# Patient Record
Sex: FEMALE | Race: WHITE | Employment: PART TIME | URBAN - METROPOLITAN AREA
[De-identification: names, ages, dates, MRNs, and addresses within clinical notes are randomized per-mention and may not be internally consistent; named-entity substitution may affect disease eponyms.]

---

## 2017-01-13 RX ORDER — ESCITALOPRAM OXALATE 10 MG/1
10 TABLET ORAL DAILY
Qty: 30 TABLET | Refills: 2 | Status: SHIPPED | OUTPATIENT
Start: 2017-01-13 | End: 2017-08-24 | Stop reason: ALTCHOICE

## 2017-01-13 RX ORDER — QUETIAPINE FUMARATE 50 MG/1
100 TABLET, EXTENDED RELEASE ORAL NIGHTLY
Qty: 60 TABLET | Refills: 2 | Status: SHIPPED | OUTPATIENT
Start: 2017-01-13 | End: 2017-08-24 | Stop reason: DRUGHIGH

## 2017-08-01 ENCOUNTER — TELEPHONE (OUTPATIENT)
Dept: FAMILY MEDICINE CLINIC | Age: 32
End: 2017-08-01

## 2017-08-24 ENCOUNTER — OFFICE VISIT (OUTPATIENT)
Dept: FAMILY MEDICINE CLINIC | Age: 32
End: 2017-08-24

## 2017-08-24 VITALS
RESPIRATION RATE: 16 BRPM | WEIGHT: 144 LBS | DIASTOLIC BLOOD PRESSURE: 70 MMHG | HEIGHT: 71 IN | HEART RATE: 70 BPM | TEMPERATURE: 99.2 F | BODY MASS INDEX: 20.16 KG/M2 | SYSTOLIC BLOOD PRESSURE: 112 MMHG

## 2017-08-24 DIAGNOSIS — F31.62 BIPOLAR DISORDER, CURRENT EPISODE MIXED, MODERATE (HCC): Primary | ICD-10-CM

## 2017-08-24 DIAGNOSIS — T50.902A SUICIDE ATTEMPT BY DRUG INGESTION, INITIAL ENCOUNTER (HCC): ICD-10-CM

## 2017-08-24 DIAGNOSIS — F51.04 PSYCHOPHYSIOLOGICAL INSOMNIA: ICD-10-CM

## 2017-08-24 PROCEDURE — 99213 OFFICE O/P EST LOW 20 MIN: CPT | Performed by: FAMILY MEDICINE

## 2017-08-24 RX ORDER — TRAZODONE HYDROCHLORIDE 100 MG/1
TABLET ORAL
Refills: 0 | COMMUNITY
Start: 2017-07-31 | End: 2017-08-24 | Stop reason: SDUPTHER

## 2017-08-24 RX ORDER — TRAZODONE HYDROCHLORIDE 100 MG/1
TABLET ORAL
Qty: 30 TABLET | Refills: 2 | Status: SHIPPED | OUTPATIENT
Start: 2017-08-24 | End: 2017-12-05 | Stop reason: SDUPTHER

## 2017-08-24 RX ORDER — VENLAFAXINE HYDROCHLORIDE 75 MG/1
CAPSULE, EXTENDED RELEASE ORAL
Qty: 30 CAPSULE | Refills: 2 | Status: SHIPPED | OUTPATIENT
Start: 2017-08-24 | End: 2017-12-05 | Stop reason: SDUPTHER

## 2017-08-24 RX ORDER — QUETIAPINE FUMARATE 100 MG/1
TABLET, FILM COATED ORAL
Qty: 60 TABLET | Refills: 2 | Status: SHIPPED | OUTPATIENT
Start: 2017-08-24 | End: 2017-12-05 | Stop reason: SDUPTHER

## 2017-08-24 RX ORDER — QUETIAPINE FUMARATE 100 MG/1
TABLET, FILM COATED ORAL
Refills: 0 | COMMUNITY
Start: 2017-07-31 | End: 2017-08-24 | Stop reason: SDUPTHER

## 2017-08-24 RX ORDER — VENLAFAXINE HYDROCHLORIDE 75 MG/1
CAPSULE, EXTENDED RELEASE ORAL
Refills: 0 | COMMUNITY
Start: 2017-07-31 | End: 2017-08-24 | Stop reason: SDUPTHER

## 2017-12-05 RX ORDER — TRAZODONE HYDROCHLORIDE 100 MG/1
TABLET ORAL
Qty: 30 TABLET | Refills: 2 | Status: SHIPPED | OUTPATIENT
Start: 2017-12-05 | End: 2018-07-03 | Stop reason: SDUPTHER

## 2017-12-05 RX ORDER — VENLAFAXINE HYDROCHLORIDE 75 MG/1
CAPSULE, EXTENDED RELEASE ORAL
Qty: 30 CAPSULE | Refills: 2 | Status: SHIPPED | OUTPATIENT
Start: 2017-12-05 | End: 2017-12-08 | Stop reason: SDUPTHER

## 2017-12-05 RX ORDER — QUETIAPINE FUMARATE 100 MG/1
TABLET, FILM COATED ORAL
Qty: 60 TABLET | Refills: 2 | Status: SHIPPED | OUTPATIENT
Start: 2017-12-05 | End: 2018-07-03 | Stop reason: SDUPTHER

## 2017-12-05 NOTE — TELEPHONE ENCOUNTER
Patient requesting refill. Medication pended for approval/denial. Thank you.     LOV  8/24/2017    NEXT APPT:  Future Appointments  Date Time Provider Fabian Holbrook   12/8/2017 8:30 AM MD Herlinda Jennings [facility] EMERGENCY [facility] AT [place]

## 2017-12-08 ENCOUNTER — OFFICE VISIT (OUTPATIENT)
Dept: FAMILY MEDICINE CLINIC | Age: 32
End: 2017-12-08

## 2017-12-08 VITALS
HEART RATE: 97 BPM | DIASTOLIC BLOOD PRESSURE: 70 MMHG | SYSTOLIC BLOOD PRESSURE: 108 MMHG | OXYGEN SATURATION: 99 % | WEIGHT: 146.6 LBS | HEIGHT: 71 IN | TEMPERATURE: 98.5 F | BODY MASS INDEX: 20.52 KG/M2

## 2017-12-08 DIAGNOSIS — F31.62 BIPOLAR DISORDER, CURRENT EPISODE MIXED, MODERATE (HCC): Primary | ICD-10-CM

## 2017-12-08 DIAGNOSIS — F51.04 PSYCHOPHYSIOLOGICAL INSOMNIA: ICD-10-CM

## 2017-12-08 PROCEDURE — 99213 OFFICE O/P EST LOW 20 MIN: CPT | Performed by: FAMILY MEDICINE

## 2017-12-08 PROCEDURE — G8484 FLU IMMUNIZE NO ADMIN: HCPCS | Performed by: FAMILY MEDICINE

## 2017-12-08 PROCEDURE — 4004F PT TOBACCO SCREEN RCVD TLK: CPT | Performed by: FAMILY MEDICINE

## 2017-12-08 PROCEDURE — 96160 PT-FOCUSED HLTH RISK ASSMT: CPT | Performed by: FAMILY MEDICINE

## 2017-12-08 PROCEDURE — G8427 DOCREV CUR MEDS BY ELIG CLIN: HCPCS | Performed by: FAMILY MEDICINE

## 2017-12-08 PROCEDURE — G8420 CALC BMI NORM PARAMETERS: HCPCS | Performed by: FAMILY MEDICINE

## 2017-12-08 RX ORDER — VENLAFAXINE HYDROCHLORIDE 150 MG/1
CAPSULE, EXTENDED RELEASE ORAL
Qty: 30 CAPSULE | Refills: 2 | Status: SHIPPED | OUTPATIENT
Start: 2017-12-08 | End: 2018-11-13

## 2017-12-08 RX ORDER — TRAZODONE HYDROCHLORIDE 100 MG/1
TABLET ORAL
Qty: 30 TABLET | Refills: 2 | Status: CANCELLED | OUTPATIENT
Start: 2017-12-08

## 2017-12-08 RX ORDER — QUETIAPINE FUMARATE 100 MG/1
TABLET, FILM COATED ORAL
Qty: 60 TABLET | Refills: 2 | Status: CANCELLED | OUTPATIENT
Start: 2017-12-08

## 2017-12-08 ASSESSMENT — PATIENT HEALTH QUESTIONNAIRE - PHQ9
3. TROUBLE FALLING OR STAYING ASLEEP: 0
4. FEELING TIRED OR HAVING LITTLE ENERGY: 2
6. FEELING BAD ABOUT YOURSELF - OR THAT YOU ARE A FAILURE OR HAVE LET YOURSELF OR YOUR FAMILY DOWN: 1
SUM OF ALL RESPONSES TO PHQ QUESTIONS 1-9: 11
10. IF YOU CHECKED OFF ANY PROBLEMS, HOW DIFFICULT HAVE THESE PROBLEMS MADE IT FOR YOU TO DO YOUR WORK, TAKE CARE OF THINGS AT HOME, OR GET ALONG WITH OTHER PEOPLE: 2
5. POOR APPETITE OR OVEREATING: 0
9. THOUGHTS THAT YOU WOULD BE BETTER OFF DEAD, OR OF HURTING YOURSELF: 0
8. MOVING OR SPEAKING SO SLOWLY THAT OTHER PEOPLE COULD HAVE NOTICED. OR THE OPPOSITE, BEING SO FIGETY OR RESTLESS THAT YOU HAVE BEEN MOVING AROUND A LOT MORE THAN USUAL: 3
SUM OF ALL RESPONSES TO PHQ9 QUESTIONS 1 & 2: 3
2. FEELING DOWN, DEPRESSED OR HOPELESS: 2
1. LITTLE INTEREST OR PLEASURE IN DOING THINGS: 1
7. TROUBLE CONCENTRATING ON THINGS, SUCH AS READING THE NEWSPAPER OR WATCHING TELEVISION: 2

## 2017-12-08 NOTE — PROGRESS NOTES
supple, no significant adenopathy, carotids upstroke normal bilaterally, no bruits, thyroid exam: thyroid is normal in size without nodules or tenderness  Chest - clear to auscultation, no wheezes, rales or rhonchi, symmetric air entry  Heart - normal rate, regular rhythm, normal S1, S2, no murmurs, rubs, clicks or gallops  Skin - normal coloration and turgor, no rashes, no suspicious skin lesions noted    1. Bipolar disorder, current episode mixed, moderate ([address]     2. Psychophysiological insomnia       I have reviewed the following diagnostic data: NA.  Please see report for additional information. Orders Placed This Encounter   Medications    venlafaxine (EFFEXOR XR) 150 MG extended release capsule     Sig: TAKE 1 CAPSULE BY MOUTH DAILY WITH BREAKFAST     Dispense:  30 capsule     Refill:  2     Patient's medications were refilled as noted. She will continue with After[facility] per [facility]. Her Effexor XR was increased to 150 mg by mouth daily. She may take 2 of her 75 mg tablets daily and then  the new prescription when needed if desired. Return in about 3 months (around 3/8/2018) for follow up on medications.

## 2018-01-11 ENCOUNTER — OFFICE VISIT (OUTPATIENT)
Dept: FAMILY MEDICINE CLINIC | Age: 33
End: 2018-01-11

## 2018-01-11 VITALS
OXYGEN SATURATION: 98 % | HEART RATE: 92 BPM | SYSTOLIC BLOOD PRESSURE: 118 MMHG | TEMPERATURE: 99.3 F | HEIGHT: 71 IN | WEIGHT: 145.4 LBS | DIASTOLIC BLOOD PRESSURE: 78 MMHG | BODY MASS INDEX: 20.35 KG/M2

## 2018-01-11 DIAGNOSIS — J01.10 ACUTE NON-RECURRENT FRONTAL SINUSITIS: Primary | ICD-10-CM

## 2018-01-11 PROCEDURE — G8420 CALC BMI NORM PARAMETERS: HCPCS | Performed by: FAMILY MEDICINE

## 2018-01-11 PROCEDURE — 99213 OFFICE O/P EST LOW 20 MIN: CPT | Performed by: FAMILY MEDICINE

## 2018-01-11 PROCEDURE — G8427 DOCREV CUR MEDS BY ELIG CLIN: HCPCS | Performed by: FAMILY MEDICINE

## 2018-01-11 PROCEDURE — G8484 FLU IMMUNIZE NO ADMIN: HCPCS | Performed by: FAMILY MEDICINE

## 2018-01-11 PROCEDURE — 4004F PT TOBACCO SCREEN RCVD TLK: CPT | Performed by: FAMILY MEDICINE

## 2018-01-11 RX ORDER — AMOXICILLIN AND CLAVULANATE POTASSIUM 875; 125 MG/1; MG/1
1 TABLET, FILM COATED ORAL 2 TIMES DAILY WITH MEALS
Qty: 20 TABLET | Refills: 0 | Status: SHIPPED | OUTPATIENT
Start: 2018-01-11 | End: 2018-01-21

## 2018-01-11 NOTE — PATIENT INSTRUCTIONS

## 2018-05-08 RX ORDER — VENLAFAXINE HYDROCHLORIDE 75 MG/1
CAPSULE, EXTENDED RELEASE ORAL
Qty: 30 CAPSULE | Refills: 1 | Status: SHIPPED | OUTPATIENT
Start: 2018-05-08 | End: 2018-07-03 | Stop reason: SDUPTHER

## 2018-05-15 NOTE — TELEPHONE ENCOUNTER
Pt called in asking for refills of seroquel, trazodone and effexor to be sent to [facility] [address]. Please approve or deny.  Thanks    Last ov and fills 8/24/17  Next scheduled ov 12/8/17    Pt can be reached at [phone] Yes, record another set of vital signs

## 2018-07-03 ENCOUNTER — OFFICE VISIT (OUTPATIENT)
Dept: FAMILY MEDICINE CLINIC | Age: 33
End: 2018-07-03
Payer: COMMERCIAL

## 2018-07-03 VITALS
WEIGHT: 133.2 LBS | DIASTOLIC BLOOD PRESSURE: 70 MMHG | SYSTOLIC BLOOD PRESSURE: 110 MMHG | BODY MASS INDEX: 18.65 KG/M2 | HEIGHT: 71 IN | HEART RATE: 90 BPM | OXYGEN SATURATION: 97 % | TEMPERATURE: 99.5 F

## 2018-07-03 DIAGNOSIS — G43.009 MIGRAINE WITHOUT AURA AND WITHOUT STATUS MIGRAINOSUS, NOT INTRACTABLE: ICD-10-CM

## 2018-07-03 DIAGNOSIS — F31.62 BIPOLAR DISORDER, CURRENT EPISODE MIXED, MODERATE (HCC): Primary | ICD-10-CM

## 2018-07-03 DIAGNOSIS — F51.04 PSYCHOPHYSIOLOGICAL INSOMNIA: ICD-10-CM

## 2018-07-03 DIAGNOSIS — L23.7 POISON IVY DERMATITIS: ICD-10-CM

## 2018-07-03 DIAGNOSIS — Z13.31 POSITIVE DEPRESSION SCREENING: ICD-10-CM

## 2018-07-03 PROCEDURE — 96160 PT-FOCUSED HLTH RISK ASSMT: CPT | Performed by: FAMILY MEDICINE

## 2018-07-03 PROCEDURE — 99214 OFFICE O/P EST MOD 30 MIN: CPT | Performed by: FAMILY MEDICINE

## 2018-07-03 PROCEDURE — G8427 DOCREV CUR MEDS BY ELIG CLIN: HCPCS | Performed by: FAMILY MEDICINE

## 2018-07-03 PROCEDURE — G8431 POS CLIN DEPRES SCRN F/U DOC: HCPCS | Performed by: FAMILY MEDICINE

## 2018-07-03 PROCEDURE — 4004F PT TOBACCO SCREEN RCVD TLK: CPT | Performed by: FAMILY MEDICINE

## 2018-07-03 PROCEDURE — G8420 CALC BMI NORM PARAMETERS: HCPCS | Performed by: FAMILY MEDICINE

## 2018-07-03 RX ORDER — ELETRIPTAN HYDROBROMIDE 20 MG/1
20 TABLET, FILM COATED ORAL
Qty: 6 TABLET | Refills: 2 | Status: SHIPPED | OUTPATIENT
Start: 2018-07-03 | End: 2018-07-03

## 2018-07-03 RX ORDER — TRAZODONE HYDROCHLORIDE 100 MG/1
TABLET ORAL
Qty: 30 TABLET | Refills: 3 | Status: SHIPPED | OUTPATIENT
Start: 2018-07-03 | End: 2018-11-13 | Stop reason: SDUPTHER

## 2018-07-03 RX ORDER — VENLAFAXINE HYDROCHLORIDE 150 MG/1
150 CAPSULE, EXTENDED RELEASE ORAL DAILY
Qty: 30 CAPSULE | Refills: 3 | Status: SHIPPED | OUTPATIENT
Start: 2018-07-03 | End: 2018-11-13 | Stop reason: SDUPTHER

## 2018-07-03 RX ORDER — QUETIAPINE FUMARATE 100 MG/1
TABLET, FILM COATED ORAL
Qty: 60 TABLET | Refills: 3 | Status: SHIPPED | OUTPATIENT
Start: 2018-07-03 | End: 2018-11-13 | Stop reason: SDUPTHER

## 2018-07-03 ASSESSMENT — PATIENT HEALTH QUESTIONNAIRE - PHQ9
4. FEELING TIRED OR HAVING LITTLE ENERGY: 3
1. LITTLE INTEREST OR PLEASURE IN DOING THINGS: 2
SUM OF ALL RESPONSES TO PHQ QUESTIONS 1-9: 11
10. IF YOU CHECKED OFF ANY PROBLEMS, HOW DIFFICULT HAVE THESE PROBLEMS MADE IT FOR YOU TO DO YOUR WORK, TAKE CARE OF THINGS AT HOME, OR GET ALONG WITH OTHER PEOPLE: 1
8. MOVING OR SPEAKING SO SLOWLY THAT OTHER PEOPLE COULD HAVE NOTICED. OR THE OPPOSITE, BEING SO FIGETY OR RESTLESS THAT YOU HAVE BEEN MOVING AROUND A LOT MORE THAN USUAL: 1
SUM OF ALL RESPONSES TO PHQ9 QUESTIONS 1 & 2: 3
3. TROUBLE FALLING OR STAYING ASLEEP: 3
2. FEELING DOWN, DEPRESSED OR HOPELESS: 1
5. POOR APPETITE OR OVEREATING: 0
6. FEELING BAD ABOUT YOURSELF - OR THAT YOU ARE A FAILURE OR HAVE LET YOURSELF OR YOUR FAMILY DOWN: 0
7. TROUBLE CONCENTRATING ON THINGS, SUCH AS READING THE NEWSPAPER OR WATCHING TELEVISION: 1
9. THOUGHTS THAT YOU WOULD BE BETTER OFF DEAD, OR OF HURTING YOURSELF: 0

## 2018-07-03 NOTE — PROGRESS NOTES
Chief Complaint   Patient presents with    Follow-up     LOV 01/11/2018    Manic Behavior    Insomnia    Discuss Medications     pt states that she had stopped taking her medication after moving out of her mother's house because they were fighting, she was off of her meds for about 1.5 months, she recently started taking the Effexor again (150 mg) but is out of the Seroquel and Trazodone     HPI: Lloyd Potter is a 35 y.o. female presenting for follow-up of Bipolar Disorder and Insomnia. I last saw the patient 01/11/2018. She does have poison ivy and has been applying a prescription steroid cream. It still has been active. She is having headaches on a daily basis and does take Excedrin Migraine and it generally is ineffective. She has been on Imitrex as a teenager without much relief. Her headaches are across the forehead and are a piercing type of pain. They may last for several hours to 2 days. Some nausea and vomiting at times. No vision changes noted. She did restart her Effexor about 1 week ago. Past Medical History:   Diagnosis Date    Depression        No past surgical history on file. family history includes Asthma in her mother; Cancer in her maternal grandmother; High Blood Pressure in her maternal aunt; Other in her mother. Social History     Social History    Marital status: Single     Spouse name: N/A    Number of children: N/A    Years of education: N/A     Occupational History    Not on file.      Social History Main Topics    Smoking status: Current Every Day Smoker     Packs/day: 1.00     Years: 12.00    Smokeless tobacco: Never Used      Comment: ecigs    Alcohol use No    Drug use: No    Sexual activity: Not on file     Other Topics Concern    Not on file     Social History Narrative    No narrative on file       No Known Allergies    Review of Systems - General ROS: negative  Psychological ROS: positive for - concentration difficulties and sleep disturbances  ENT ROS: = Moderate depression, 15-19 = Moderately severe depression, 20-27 = Severe depression      On the basis of positive PHQ-9 screening (PHQ-9 Total Score: 11), the following plan was implemented: medication prescribed: Effexor- 150 mg with Seroquel 100 mg daily and trazodone 100 mg daily. - patient will call for any significant medication side effects or worsening symptoms of depression. Patient will follow-up in 1 month(s) with PCP.

## 2018-11-13 ENCOUNTER — OFFICE VISIT (OUTPATIENT)
Dept: FAMILY MEDICINE CLINIC | Age: 33
End: 2018-11-13
Payer: COMMERCIAL

## 2018-11-13 VITALS
OXYGEN SATURATION: 98 % | HEIGHT: 70 IN | DIASTOLIC BLOOD PRESSURE: 68 MMHG | SYSTOLIC BLOOD PRESSURE: 112 MMHG | WEIGHT: 146 LBS | TEMPERATURE: 97.8 F | RESPIRATION RATE: 12 BRPM | BODY MASS INDEX: 20.9 KG/M2 | HEART RATE: 57 BPM

## 2018-11-13 DIAGNOSIS — Z00.00 WELLNESS EXAMINATION: Primary | ICD-10-CM

## 2018-11-13 PROCEDURE — G8484 FLU IMMUNIZE NO ADMIN: HCPCS | Performed by: NURSE PRACTITIONER

## 2018-11-13 PROCEDURE — 99395 PREV VISIT EST AGE 18-39: CPT | Performed by: NURSE PRACTITIONER

## 2018-11-13 RX ORDER — TRAZODONE HYDROCHLORIDE 100 MG/1
TABLET ORAL
Qty: 30 TABLET | Refills: 3 | Status: SHIPPED | OUTPATIENT
Start: 2018-11-13

## 2018-11-13 RX ORDER — VENLAFAXINE HYDROCHLORIDE 150 MG/1
150 CAPSULE, EXTENDED RELEASE ORAL DAILY
Qty: 30 CAPSULE | Refills: 2 | Status: SHIPPED | OUTPATIENT
Start: 2018-11-13 | End: 2021-11-25

## 2018-11-13 RX ORDER — QUETIAPINE FUMARATE 100 MG/1
TABLET, FILM COATED ORAL
Qty: 30 TABLET | Refills: 2 | Status: SHIPPED | OUTPATIENT
Start: 2018-11-13

## 2018-11-13 ASSESSMENT — ENCOUNTER SYMPTOMS
SHORTNESS OF BREATH: 0
ABDOMINAL PAIN: 0
CHEST TIGHTNESS: 0

## 2021-08-12 ENCOUNTER — HOSPITAL ENCOUNTER (EMERGENCY)
Age: 36
Discharge: HOME | End: 2021-08-12
Attending: EMERGENCY MEDICINE
Payer: COMMERCIAL

## 2021-08-12 VITALS
WEIGHT: 140 LBS | HEART RATE: 60 BPM | TEMPERATURE: 98.3 F | HEIGHT: 70 IN | OXYGEN SATURATION: 100 % | DIASTOLIC BLOOD PRESSURE: 77 MMHG | RESPIRATION RATE: 16 BRPM | BODY MASS INDEX: 20.04 KG/M2 | SYSTOLIC BLOOD PRESSURE: 119 MMHG

## 2021-08-12 DIAGNOSIS — K08.89 PAIN, DENTAL: Primary | ICD-10-CM

## 2021-08-12 PROCEDURE — 99283 EMERGENCY DEPT VISIT LOW MDM: CPT

## 2021-08-12 RX ORDER — HYDROCODONE BITARTRATE AND ACETAMINOPHEN 5; 325 MG/1; MG/1
1 TABLET ORAL EVERY 4 HOURS PRN
Qty: 12 TABLET | Refills: 0 | Status: SHIPPED | OUTPATIENT
Start: 2021-08-12 | End: 2021-08-15

## 2021-08-12 RX ORDER — AMOXICILLIN 500 MG/1
500 CAPSULE ORAL 2 TIMES DAILY
Qty: 14 CAPSULE | Refills: 0 | Status: SHIPPED | OUTPATIENT
Start: 2021-08-12 | End: 2021-08-19

## 2021-08-12 ASSESSMENT — PAIN DESCRIPTION - DESCRIPTORS
DESCRIPTORS: THROBBING;STABBING
DESCRIPTORS: THROBBING;STABBING

## 2021-08-12 ASSESSMENT — PAIN DESCRIPTION - LOCATION
LOCATION: TEETH
LOCATION: TEETH

## 2021-08-12 ASSESSMENT — PAIN SCALES - GENERAL
PAINLEVEL_OUTOF10: 10
PAINLEVEL_OUTOF10: 10

## 2021-08-12 ASSESSMENT — PAIN DESCRIPTION - PROGRESSION
CLINICAL_PROGRESSION: GRADUALLY WORSENING
CLINICAL_PROGRESSION: GRADUALLY WORSENING

## 2021-08-12 ASSESSMENT — PAIN DESCRIPTION - FREQUENCY
FREQUENCY: CONTINUOUS
FREQUENCY: CONTINUOUS

## 2021-08-12 ASSESSMENT — PAIN DESCRIPTION - ONSET
ONSET: PROGRESSIVE
ONSET: PROGRESSIVE

## 2021-08-12 ASSESSMENT — PAIN DESCRIPTION - PAIN TYPE
TYPE: ACUTE PAIN
TYPE: ACUTE PAIN

## 2021-08-12 ASSESSMENT — PAIN DESCRIPTION - ORIENTATION
ORIENTATION: RIGHT;LOWER
ORIENTATION: RIGHT;LOWER

## 2021-08-12 NOTE — ED NOTES
Discharge instructions given. Patient verbalizes understanding and denies any questions. Scripts x2 given. Patient ambulating with steady gait upon discharge.         Zach Asencio RN  08/12/21 9981

## 2021-08-12 NOTE — ED PROVIDER NOTES
[facility] ED  EMERGENCY DEPARTMENT ENCOUNTER      Pt Name: Kylie John  MRN: [id number]  [place] 1985  Date of evaluation: 8/12/2021  Provider: Mirella Peñaloza MD    CHIEF COMPLAINT       Chief Complaint   Patient presents with    Dental Pain    Oral Swelling         HISTORY OF PRESENT ILLNESS   (Location/Symptom, Timing/Onset, Context/Setting, Quality, Duration, Modifying Factors, Severity)  Note limiting factors. Presents with bottom R molar pain. Has appt with dentist in 3-4 days. Pain ongoing for 2 days and getting worse. Not controlled with ibuprofen. Able to eat and drink. Pain is constant and nonradiating. Nursing Notes were reviewed. REVIEW OF SYSTEMS    (2-9 systems for level 4, 10 or more for level 5)     Review of Systems   HENT: Positive for dental problem. All other systems reviewed and are negative. Except as noted above the remainder of the review of systems was reviewed and negative. PAST MEDICAL HISTORY     Past Medical History:   Diagnosis Date    Depression          SURGICAL HISTORY     No past surgical history on file. CURRENT MEDICATIONS       Previous Medications    ELETRIPTAN (RELPAX) 20 MG TABLET    Take 1 tablet by mouth once as needed (migraine headache) may repeat in 2 hours if necessary    QUETIAPINE (SEROQUEL) 100 MG TABLET    Take 1 Tab PO QHS    TRAZODONE (DESYREL) 100 MG TABLET    Take 1 Tab PO QHS    VENLAFAXINE (EFFEXOR XR) 150 MG EXTENDED RELEASE CAPSULE    Take 1 capsule by mouth daily       ALLERGIES     Patient has no known allergies.     FAMILY HISTORY       Family History   Problem Relation Age of Onset    Asthma Mother     Other Mother         beverly's and hypothyroidism    High Blood Pressure Maternal Aunt     Cancer Maternal Grandmother           SOCIAL HISTORY       Social History     Socioeconomic History    Marital status: Single     Spouse name: Not on file    Number of children: Not on file    Years of education: Not on file    Highest education level: Not on file   Occupational History    Not on file   Tobacco Use    Smoking status: Current Every Day Smoker     Packs/day: 1.00     Years: 12.00     Pack years: 12.00    Smokeless tobacco: Never Used    Tobacco comment: ecigs   Substance and Sexual Activity    Alcohol use: No    Drug use: No    Sexual activity: Not on file   Other Topics Concern    Not on file   Social History Narrative    Not on file     Social Determinants of Health     Financial Resource Strain:     Difficulty of Paying Living Expenses:    Food Insecurity:     Worried About Running Out of Food in the Last Year:     Ran Out of Food in the Last Year:    Transportation Needs:     Lack of Transportation (Medical):  Lack of Transportation (Non-Medical):    Physical Activity:     Days of Exercise per Week:     Minutes of Exercise per Session:    Stress:     Feeling of Stress :    Social Connections:     Frequency of Communication with Friends and Family:     Frequency of Social Gatherings with Friends and Family:     Attends Congregation Services:     Active Member of Clubs or Organizations:     Attends Club or Organization Meetings:     Marital Status:    Intimate Partner Violence:     Fear of Current or Ex-Partner:     Emotionally Abused:     Physically Abused:     Sexually Abused:        SCREENINGS               PHYSICAL EXAM    (up to 7 for level 4, 8 or more for level 5)     ED Triage Vitals [08/12/21 1640]   BP Temp Temp Source Pulse Resp SpO2 Height Weight   115/69 98.3 °F (36.8 °C) Skin 65 12 99 % 5' 10\" (1.778 m) 140 lb (63.5 kg)       Physical Exam  Vitals and nursing note reviewed. Constitutional:       General: She is not in acute distress. Appearance: Normal appearance. She is well-developed. She is not ill-appearing. HENT:      Head: Normocephalic and atraumatic.       Mouth/Throat:      Mouth: Mucous membranes are moist.      Pharynx: Oropharynx is clear.      Comments: Multiple dental caries, no abscess  Eyes:      Extraocular Movements: Extraocular movements intact. Conjunctiva/sclera: Conjunctivae normal.   Cardiovascular:      Rate and Rhythm: Normal rate and regular rhythm. Pulmonary:      Effort: Pulmonary effort is normal.      Breath sounds: Normal breath sounds. Abdominal:      General: Bowel sounds are normal.      Palpations: Abdomen is soft. Tenderness: There is no abdominal tenderness. Musculoskeletal:         General: No deformity. Normal range of motion. Cervical back: Normal range of motion and neck supple. Skin:     General: Skin is warm and dry. Capillary Refill: Capillary refill takes less than 2 seconds. Neurological:      General: No focal deficit present. Mental Status: She is alert and oriented to person, place, and time. Mental status is at baseline. Cranial Nerves: No cranial nerve deficit. Psychiatric:         Thought Content: Thought content normal.         DIAGNOSTIC RESULTS     EKG: All EKG's are interpreted by the Emergency Department Physician who either signs or Co-signs this chart in the absence of a cardiologist.    RADIOLOGY:   Non-plain film images such as CT, Ultrasound and MRI are read by the radiologist. Plain radiographic images are visualized and preliminarily interpreted by the emergency physician with the below findings:    Interpretation per the Radiologist below, if available at the time of this note:    No orders to display       LABS:  Labs Reviewed - No data to display    All other labs were within normal range or not returned as of this dictation.     EMERGENCY DEPARTMENT COURSE and DIFFERENTIAL DIAGNOSIS/MDM:   Vitals:    Vitals:    08/12/21 1640   BP: 115/69   Pulse: 65   Resp: 12   Temp: 98.3 °F (36.8 °C)   TempSrc: Skin   SpO2: 99%   Weight: 140 lb (63.5 kg)   Height: 5' 10\" (1.778 m)       MDM    Procedures    CRITICAL CARE TIME   Total Critical Care time was 0 minutes, excluding separately reportable procedures. There was a high probability of clinically significant/life threatening deterioration in the patient's condition which required my urgent intervention. FINAL IMPRESSION      1.  Pain, dental Stable         DISPOSITION/PLAN   DISPOSITION Decision To Discharge 08/12/2021 06:21:08 PM      (Please note that portions of this note were completed with a voice recognition program.  Efforts were made to edit the dictations but occasionally words are mis-transcribed.)    Ty López MD (electronically signed)  Attending Emergency Physician        Ty López MD  08/12/21 0240

## 2021-11-25 ENCOUNTER — HOSPITAL ENCOUNTER (EMERGENCY)
Age: 36
Discharge: HOME | End: 2021-11-25
Payer: COMMERCIAL

## 2021-11-25 VITALS
DIASTOLIC BLOOD PRESSURE: 78 MMHG | SYSTOLIC BLOOD PRESSURE: 121 MMHG | RESPIRATION RATE: 16 BRPM | HEART RATE: 91 BPM | HEIGHT: 70 IN | OXYGEN SATURATION: 98 % | WEIGHT: 145 LBS | TEMPERATURE: 98.5 F | BODY MASS INDEX: 20.76 KG/M2

## 2021-11-25 DIAGNOSIS — N30.00 ACUTE CYSTITIS WITHOUT HEMATURIA: Primary | ICD-10-CM

## 2021-11-25 LAB
BACTERIA: ABNORMAL /HPF
BILIRUBIN URINE: NEGATIVE
BLOOD, URINE: NEGATIVE
CLARITY: CLEAR
COLOR: YELLOW
EPITHELIAL CELLS, UA: ABNORMAL /HPF (ref 0–5)
GLUCOSE URINE: NEGATIVE MG/DL
HCG, URINE, POC: NEGATIVE
HYALINE CASTS: ABNORMAL /HPF (ref 0–5)
KETONES, URINE: ABNORMAL MG/DL
LEUKOCYTE ESTERASE, URINE: ABNORMAL
Lab: NORMAL
NEGATIVE QC PASS/FAIL: NORMAL
NITRITE, URINE: NEGATIVE
PH UA: 5.5 (ref 5–9)
POSITIVE QC PASS/FAIL: NORMAL
PROTEIN UA: NEGATIVE MG/DL
RBC UA: ABNORMAL /HPF (ref 0–5)
SPECIFIC GRAVITY UA: 1.03 (ref 1–1.03)
URINE REFLEX TO CULTURE: YES
UROBILINOGEN, URINE: 0.2 E.U./DL
WBC UA: ABNORMAL /HPF (ref 0–5)

## 2021-11-25 PROCEDURE — 6370000000 HC RX 637 (ALT 250 FOR IP): Performed by: PHYSICIAN ASSISTANT

## 2021-11-25 PROCEDURE — 87186 SC STD MICRODIL/AGAR DIL: CPT

## 2021-11-25 PROCEDURE — 87086 URINE CULTURE/COLONY COUNT: CPT

## 2021-11-25 PROCEDURE — 81001 URINALYSIS AUTO W/SCOPE: CPT

## 2021-11-25 PROCEDURE — 99284 EMERGENCY DEPT VISIT MOD MDM: CPT

## 2021-11-25 PROCEDURE — 87077 CULTURE AEROBIC IDENTIFY: CPT

## 2021-11-25 RX ORDER — CEPHALEXIN 500 MG/1
500 CAPSULE ORAL ONCE
Status: COMPLETED | OUTPATIENT
Start: 2021-11-25 | End: 2021-11-25

## 2021-11-25 RX ORDER — CEPHALEXIN 500 MG/1
500 CAPSULE ORAL 2 TIMES DAILY
Qty: 14 CAPSULE | Refills: 0 | Status: SHIPPED | OUTPATIENT
Start: 2021-11-25 | End: 2021-12-02

## 2021-11-25 RX ORDER — PHENAZOPYRIDINE HYDROCHLORIDE 200 MG/1
200 TABLET, FILM COATED ORAL 3 TIMES DAILY PRN
Qty: 6 TABLET | Refills: 0 | Status: SHIPPED | OUTPATIENT
Start: 2021-11-25 | End: 2021-11-28

## 2021-11-25 RX ADMIN — CEPHALEXIN 500 MG: 500 CAPSULE ORAL at 16:15

## 2021-11-25 ASSESSMENT — ENCOUNTER SYMPTOMS
EYE PAIN: 0
ALLERGIC/IMMUNOLOGIC NEGATIVE: 1
SHORTNESS OF BREATH: 0
TROUBLE SWALLOWING: 0
COLOR CHANGE: 0
APNEA: 0
ABDOMINAL PAIN: 0

## 2021-11-25 ASSESSMENT — PAIN SCALES - GENERAL: PAINLEVEL_OUTOF10: 2

## 2021-11-25 ASSESSMENT — PAIN DESCRIPTION - LOCATION: LOCATION: VAGINA

## 2021-11-25 ASSESSMENT — PAIN DESCRIPTION - DESCRIPTORS: DESCRIPTORS: OTHER (COMMENT)

## 2021-11-25 ASSESSMENT — PAIN DESCRIPTION - PAIN TYPE: TYPE: ACUTE PAIN

## 2021-11-25 NOTE — ED TRIAGE NOTES
A & Ox4. Skin pink warm and dry. Pt states she has had burning with urination x 2 days. Denies any other complaints. Denies abdominal pain or vaginal discharge.

## 2021-11-25 NOTE — ED PROVIDER NOTES
[facility] ED  eMERGENCYdEPARTMENT eNCOUnter      Pt Name: Phil Bush  MRN: [id number]  [place] 1985  Date of evaluation: 11/25/2021  Provider:Bernardo Call PA-C    CHIEF COMPLAINT       Chief Complaint   Patient presents with    Dysuria     Burning with urination x 2 days         HISTORY OF PRESENT ILLNESS  (Location/Symptom, Timing/Onset, Context/Setting, Quality, Duration, Modifying Factors, Severity.)   Phil Bush is a 39 y.o. female who presents to the emergency department with a 2-day history of urinary urgency, frequency and dysuria. Patient denies any nausea, vomiting or diarrhea. No fevers or chills. No chance of pregnancy. Patient has not tried taking anything over-the-counter for the symptoms. HPI    Nursing Notes were reviewed and I agree. REVIEW OF SYSTEMS    (2-9 systems for level 4, 10 or more for level 5)     Review of Systems   Constitutional: Negative for diaphoresis and fever. HENT: Negative for hearing loss and trouble swallowing. Eyes: Negative for pain. Respiratory: Negative for apnea and shortness of breath. Cardiovascular: Negative for chest pain. Gastrointestinal: Negative for abdominal pain. Endocrine: Negative. Genitourinary: Positive for dysuria, frequency and urgency. Negative for hematuria. Musculoskeletal: Negative for neck pain and neck stiffness. Skin: Negative for color change. Allergic/Immunologic: Negative. Neurological: Negative for dizziness and numbness. Hematological: Negative. Psychiatric/Behavioral: Negative. All other systems reviewed and are negative. Except as noted above the remainder of the review of systems was reviewed and negative. PAST MEDICAL HISTORY     Past Medical History:   Diagnosis Date    Depression     Migraines          SURGICAL HISTORY     History reviewed. No pertinent surgical history.       CURRENT MEDICATIONS       Previous Medications    ELETRIPTAN (RELPAX) 20 MG TABLET Take 1 tablet by mouth once as needed (migraine headache) may repeat in 2 hours if necessary    QUETIAPINE (SEROQUEL) 100 MG TABLET    Take 1 Tab PO QHS    TRAZODONE (DESYREL) 100 MG TABLET    Take 1 Tab PO QHS    VENLAFAXINE (EFFEXOR XR) 150 MG EXTENDED RELEASE CAPSULE    Take 1 capsule by mouth daily       ALLERGIES     Patient has no known allergies. FAMILY HISTORY       Family History   Problem Relation Age of Onset   [facility] Asthma Mother     Other Mother         beverly's and hypothyroidism    High Blood Pressure Maternal Aunt     Cancer Maternal Grandmother           SOCIAL HISTORY       Social History     Socioeconomic History    Marital status: Single     Spouse name: None    Number of children: None    Years of education: None    Highest education level: None   Occupational History    None   Tobacco Use    Smoking status: Current Every Day Smoker     Packs/day: 0.50     Years: 12.00     Pack years: 6.00     Types: Cigarettes    Smokeless tobacco: Never Used    Tobacco comment: ecigs   Vaping Use    Vaping Use: Never used   Substance and Sexual Activity    Alcohol use: Yes     Comment: rarely    Drug use: Yes     Types: Marijuana Ruba Blaine)    Sexual activity: None   Other Topics Concern    None   Social History Narrative    None     Social Determinants of Health     Financial Resource Strain:     Difficulty of Paying Living Expenses: Not on file   Food Insecurity:     Worried About Running Out of Food in the Last Year: Not on file    Panfilo of Food in the Last Year: Not on file   Transportation Needs:     Lack of Transportation (Medical): Not on file    Lack of Transportation (Non-Medical):  Not on file   Physical Activity:     Days of Exercise per Week: Not on file    Minutes of Exercise per Session: Not on file   Stress:     Feeling of Stress : Not on file   Social Connections:     Frequency of Communication with Friends and Family: Not on file    Frequency of Social Gatherings with Friends and Family: Not on file    Attends Jehovah's witness Services: Not on file    Active Member of Clubs or Organizations: Not on file    Attends Club or Organization Meetings: Not on file    Marital Status: Not on file   Intimate Partner Violence:     Fear of Current or Ex-Partner: Not on file    Emotionally Abused: Not on file    Physically Abused: Not on file    Sexually Abused: Not on file   Housing Stability:     Unable to Pay for Housing in the Last Year: Not on file    Number of [place] in the Last Year: Not on file    Unstable Housing in the Last Year: Not on file       SCREENINGS           PHYSICAL EXAM    (up to 7 forlevel 4, 8 or more for level 5)     ED Triage Vitals [11/25/21 1524]   BP Temp Temp Source Pulse Resp SpO2 Height Weight   121/78 98.5 °F (36.9 °C) Oral 91 16 98 % 5' 10\" (1.778 m) 145 lb (65.8 kg)       Physical Exam  Vitals and nursing note reviewed. Constitutional:       General: She is not in acute distress. Appearance: She is well-developed. She is not diaphoretic. HENT:      Head: Normocephalic and atraumatic. Mouth/Throat:      Pharynx: No oropharyngeal exudate. Eyes:      General: No scleral icterus. Conjunctiva/sclera: Conjunctivae normal.      Pupils: Pupils are equal, round, and reactive to light. Neck:      Trachea: No tracheal deviation. Cardiovascular:      Rate and Rhythm: Normal rate. Heart sounds: Normal heart sounds. Pulmonary:      Effort: Pulmonary effort is normal. No respiratory distress. Breath sounds: Normal breath sounds. Abdominal:      General: Bowel sounds are normal. There is no distension. Palpations: Abdomen is soft. Musculoskeletal:         General: Normal range of motion. Cervical back: Normal range of motion and neck supple. Skin:     General: Skin is warm and dry. Findings: No erythema or rash. Neurological:      Mental Status: She is alert and oriented to person, place, and time. Cranial Nerves: No cranial nerve deficit. Motor: No abnormal muscle tone. Psychiatric:         Behavior: Behavior normal.         Thought Content: Thought content normal.         Judgment: Judgment normal.           DIAGNOSTIC RESULTS     RADIOLOGY:   Non-plain film images such as CT, Ultrasound and MRI are read by the radiologist. Plain radiographic images are visualized and preliminarilyinterpreted by Judith Nina PA-C with the below findings:      Interpretation per the Radiologist below, if available at the time of this note:    No orders to display       LABS:  Labs Reviewed   URINE RT REFLEX TO CULTURE - Abnormal; Notable for the following components:       Result Value    Ketones, Urine TRACE (*)     Leukocyte Esterase, Urine SMALL (*)     All other components within normal limits   MICROSCOPIC URINALYSIS - Abnormal; Notable for the following components:    Bacteria, UA MANY (*)     WBC, UA 10-20 (*)     RBC, UA 3-5 (*)     All other components within normal limits   CULTURE, URINE   POC PREGNANCY UR-QUAL       All other labs were within normal range or not returnedas of this dictation. EMERGENCYDEPARTMENT COURSE and DIFFERENTIAL DIAGNOSIS/MDM:   Vitals:    Vitals:    11/25/21 1524   BP: 121/78   Pulse: 91   Resp: 16   Temp: 98.5 °F (36.9 °C)   TempSrc: Oral   SpO2: 98%   Weight: 145 lb (65.8 kg)   Height: 5' 10\" (1.778 m)       REASSESSMENT        Patient presents emergency department with urinary urgency, frequency and dysuria. Symptomatic for UTI today. Patient placed on Pyridium and Keflex. Follow-up with PCP    MDM    PROCEDURES:    Procedures      FINAL IMPRESSION      1.  Acute cystitis without hematuria          DISPOSITION/PLAN   DISPOSITION Discharge - Pending Orders Complete 11/25/2021 04:05:07 PM      PATIENT REFERRED TO:  Regine Marshall MD  [facility] [phone]    In 1 week        DISCHARGE MEDICATIONS:  New Prescriptions    CEPHALEXIN ([place]) 500 MG CAPSULE    Take 1 capsule by mouth 2 times daily for 7 days    PHENAZOPYRIDINE (PYRIDIUM) 200 MG TABLET    Take 1 tablet by mouth 3 times daily as needed for Pain (bladder spasm/pain)       (Please note that portions of this note were completed with a voice recognition program.  Efforts were made to edit the dictations but occasionally words are mis-transcribed.)    ALVARO Roberson PA-C  11/25/21 7525

## 2021-11-28 LAB
ORGANISM: ABNORMAL
URINE CULTURE, ROUTINE: ABNORMAL

## 2021-12-07 ENCOUNTER — HOSPITAL ENCOUNTER (EMERGENCY)
Age: 36
Discharge: HOME | End: 2021-12-07
Payer: COMMERCIAL

## 2021-12-07 VITALS
WEIGHT: 140 LBS | TEMPERATURE: 98.8 F | RESPIRATION RATE: 17 BRPM | BODY MASS INDEX: 20.04 KG/M2 | OXYGEN SATURATION: 100 % | HEART RATE: 57 BPM | SYSTOLIC BLOOD PRESSURE: 107 MMHG | HEIGHT: 70 IN | DIASTOLIC BLOOD PRESSURE: 71 MMHG

## 2021-12-07 DIAGNOSIS — N76.0 VAGINITIS AND VULVOVAGINITIS: Primary | ICD-10-CM

## 2021-12-07 LAB
BACTERIA: NEGATIVE /HPF
BILIRUBIN URINE: NEGATIVE
BLOOD, URINE: NEGATIVE
CLARITY: CLEAR
COLOR: YELLOW
EPITHELIAL CELLS, UA: NORMAL /HPF (ref 0–5)
GLUCOSE URINE: NEGATIVE MG/DL
HYALINE CASTS: NORMAL /HPF (ref 0–5)
KETONES, URINE: NEGATIVE MG/DL
LEUKOCYTE ESTERASE, URINE: ABNORMAL
NITRITE, URINE: NEGATIVE
PH UA: 8 (ref 5–9)
PROTEIN UA: NEGATIVE MG/DL
RBC UA: NORMAL /HPF (ref 0–5)
SPECIFIC GRAVITY UA: 1.01 (ref 1–1.03)
URINE REFLEX TO CULTURE: ABNORMAL
UROBILINOGEN, URINE: 0.2 E.U./DL
WBC UA: NORMAL /HPF (ref 0–5)

## 2021-12-07 PROCEDURE — 81001 URINALYSIS AUTO W/SCOPE: CPT

## 2021-12-07 PROCEDURE — 99283 EMERGENCY DEPT VISIT LOW MDM: CPT

## 2021-12-07 RX ORDER — FLUCONAZOLE 150 MG/1
150 TABLET ORAL ONCE
Qty: 2 TABLET | Refills: 0 | Status: SHIPPED | OUTPATIENT
Start: 2021-12-07 | End: 2021-12-07

## 2021-12-07 ASSESSMENT — ENCOUNTER SYMPTOMS
BACK PAIN: 0
SHORTNESS OF BREATH: 0
ABDOMINAL PAIN: 0
COUGH: 0

## 2021-12-07 NOTE — ED TRIAGE NOTES
Pt c/o [posible yeast infection, pt states she has white vaginal discharge and burning x 2 days, pt was recently treated and completed antibiotics for UTI, pt has o other c/o, skin w/d/pink, 0 distress, 0 pain, a&ox4, steady gait noted.

## 2021-12-07 NOTE — ED PROVIDER NOTES
[address] [facility] ED  eMERGENCY dEPARTMENT eNCOUnter      Pt Name: Kiara Griffith  MRN: [id number]  [place] 1985  Date of evaluation: 12/7/2021  Provider: TAY Senior CNP      HISTORY OF PRESENT ILLNESS    Kiara Griffith is a 39 y.o. female who presents to the Emergency Department with vaginal burning and itching x 2 days. Patient denies any vaginal discharge and is not concerned for STD's. She was treated 2 weeks ago for  UTI with ATB. Sx started after that. Denies fever, abdominal pain, N/V. Pain is mild. REVIEW OF SYSTEMS       Review of Systems   Constitutional: Negative for activity change, appetite change and fever. HENT: Negative for congestion. Respiratory: Negative for cough and shortness of breath. Cardiovascular: Negative for chest pain. Gastrointestinal: Negative for abdominal pain. Genitourinary: Positive for dysuria. Negative for difficulty urinating, genital sores, pelvic pain, vaginal bleeding and vaginal discharge. Musculoskeletal: Negative for arthralgias and back pain. Skin: Negative for rash. All other systems reviewed and are negative. PAST MEDICAL HISTORY     Past Medical History:   Diagnosis Date    Depression     Migraines     UTI (urinary tract infection)          SURGICAL HISTORY     History reviewed. No pertinent surgical history. CURRENT MEDICATIONS       Previous Medications    ELETRIPTAN (RELPAX) 20 MG TABLET    Take 1 tablet by mouth once as needed (migraine headache) may repeat in 2 hours if necessary    QUETIAPINE (SEROQUEL) 100 MG TABLET    Take 1 Tab PO QHS    TRAZODONE (DESYREL) 100 MG TABLET    Take 1 Tab PO QHS    VENLAFAXINE (EFFEXOR XR) 150 MG EXTENDED RELEASE CAPSULE    Take 1 capsule by mouth daily       ALLERGIES     Patient has no known allergies.     FAMILY HISTORY       Family History   Problem Relation Age of Onset    Asthma Mother     Other Mother         beverly's and hypothyroidism    High Blood Pressure Maternal Aunt     Cancer Maternal Grandmother           SOCIAL HISTORY       Social History     Socioeconomic History    Marital status: Single     Spouse name: None    Number of children: None    Years of education: None    Highest education level: None   Occupational History    None   Tobacco Use    Smoking status: Current Every Day Smoker     Packs/day: 0.50     Years: 12.00     Pack years: 6.00     Types: Cigarettes    Smokeless tobacco: Never Used    Tobacco comment: ecigs   Vaping Use    Vaping Use: Never used   Substance and Sexual Activity    Alcohol use: Yes     Comment: rarely    Drug use: Yes     Types: Marijuana Ej Mustard)    Sexual activity: None   Other Topics Concern    None   Social History Narrative    None     Social Determinants of Health     Financial Resource Strain:     Difficulty of Paying Living Expenses: Not on file   Food Insecurity:     Worried About Running Out of Food in the Last Year: Not on file    Panfilo of Food in the Last Year: Not on file   Transportation Needs:     Lack of Transportation (Medical): Not on file    Lack of Transportation (Non-Medical):  Not on file   Physical Activity:     Days of Exercise per Week: Not on file    Minutes of Exercise per Session: Not on file   Stress:     Feeling of Stress : Not on file   Social Connections:     Frequency of Communication with Friends and Family: Not on file    Frequency of Social Gatherings with Friends and Family: Not on file    Attends Zoroastrianism Services: Not on file    Active Member of Clubs or Organizations: Not on file    Attends Club or Organization Meetings: Not on file    Marital Status: Not on file   Intimate Partner Violence:     Fear of Current or Ex-Partner: Not on file    Emotionally Abused: Not on file    Physically Abused: Not on file    Sexually Abused: Not on file   Housing Stability:     Unable to Pay for Housing in the Last Year: Not on file    Number of [place] in the Last Year: Not on file    Unstable Housing in the Last Year: Not on file       SCREENINGS      @FLOW([id number])@      PHYSICAL EXAM    (up to 7 for level 4, 8 or more for level 5)     ED Triage Vitals   BP Temp Temp Source Pulse Resp SpO2 Height Weight   12/07/21 1635 12/07/21 1633 12/07/21 1633 12/07/21 1633 12/07/21 1633 12/07/21 1633 12/07/21 1633 12/07/21 1633   107/71 98.8 °F (37.1 °C) Oral 57 17 100 % 5' 10\" (1.778 m) 140 lb (63.5 kg)       Physical Exam  Vitals and nursing note reviewed. Constitutional:       Appearance: She is well-developed. HENT:      Head: Normocephalic and atraumatic. Right Ear: Hearing and external ear normal.      Left Ear: Hearing and external ear normal.      Nose: Nose normal.      Mouth/Throat:      Lips: Pink. Mouth: Mucous membranes are moist.   Eyes:      Conjunctiva/sclera: Conjunctivae normal.      Pupils: Pupils are equal, round, and reactive to light. Cardiovascular:      Rate and Rhythm: Normal rate and regular rhythm. Pulmonary:      Effort: Pulmonary effort is normal.      Breath sounds: Normal breath sounds. Abdominal:      General: Bowel sounds are normal. There is no distension. Palpations: Abdomen is soft. Tenderness: There is no abdominal tenderness. Genitourinary:      Musculoskeletal:         General: Normal range of motion. Cervical back: Normal range of motion and neck supple. Skin:     General: Skin is warm and dry. Neurological:      Mental Status: She is alert and oriented to person, place, and time. Deep Tendon Reflexes: Reflexes are normal and symmetric. Psychiatric:         Judgment: Judgment normal.           All other labs were within normal range or not returned as of this dictation.     EMERGENCY DEPARTMENT COURSE and DIFFERENTIALDIAGNOSIS/MDM:   Vitals:    Vitals:    12/07/21 1633 12/07/21 1635   BP:  107/71   Pulse: 57    Resp: 17    Temp: 98.8 °F (37.1 °C)    TempSrc: Oral    SpO2: 100%    Weight: 140 lb (63.5 kg)    Height: 5' 10\" (1.778 m)             39 yr old female with vaginitis. Prescription for Diflucan were given to the patient. F/U With urology if symptoms persist.  Patient verbalize understanding. PROCEDURES:  Unless otherwise noted below, none     Procedures      FINAL IMPRESSION      1.  Vaginitis and vulvovaginitis          DISPOSITION/PLAN   DISPOSITION Decision To Discharge 12/07/2021 05:22:36 PM          TAY Baum CNP (electronically signed)  Attending Emergency Physician     TAY Baum CNP  12/07/21 1888

## 2023-03-22 PROBLEM — T74.91XA DOMESTIC ABUSE OF ADULT, INITIAL ENCOUNTER: Status: ACTIVE | Noted: 2023-03-22

## 2023-03-22 PROBLEM — F41.9 ANXIETY: Status: ACTIVE | Noted: 2023-03-22

## 2023-03-22 PROBLEM — F32.9 MAJOR DEPRESSION, CHRONIC: Status: ACTIVE | Noted: 2023-03-22

## 2023-03-22 PROBLEM — O99.810 ABNORMAL MATERNAL GLUCOSE TOLERANCE, COMPLICATING PREGNANCY (HHS-HCC): Status: ACTIVE | Noted: 2023-03-22

## 2023-03-22 PROBLEM — G47.00 INSOMNIA: Status: ACTIVE | Noted: 2023-03-22

## 2023-03-22 RX ORDER — HYDROXYZINE HYDROCHLORIDE 25 MG/1
1 TABLET, FILM COATED ORAL 3 TIMES DAILY PRN
COMMUNITY
Start: 2022-02-09 | End: 2023-03-28 | Stop reason: ALTCHOICE

## 2023-03-22 RX ORDER — VENLAFAXINE HYDROCHLORIDE 150 MG/1
1 CAPSULE, EXTENDED RELEASE ORAL DAILY
COMMUNITY
Start: 2019-04-19 | End: 2023-03-28 | Stop reason: ALTCHOICE

## 2023-03-22 RX ORDER — QUETIAPINE FUMARATE 50 MG/1
1 TABLET, FILM COATED ORAL DAILY
COMMUNITY
Start: 2021-01-05 | End: 2023-03-28 | Stop reason: ALTCHOICE

## 2023-03-22 RX ORDER — TRAZODONE HYDROCHLORIDE 100 MG/1
1 TABLET ORAL NIGHTLY
COMMUNITY
Start: 2020-05-06 | End: 2023-03-28 | Stop reason: ALTCHOICE

## 2023-03-28 ENCOUNTER — OFFICE VISIT (OUTPATIENT)
Dept: PRIMARY CARE | Facility: CLINIC | Age: 38
End: 2023-03-28
Payer: COMMERCIAL

## 2023-03-28 VITALS
HEIGHT: 70 IN | OXYGEN SATURATION: 98 % | WEIGHT: 187.8 LBS | SYSTOLIC BLOOD PRESSURE: 97 MMHG | DIASTOLIC BLOOD PRESSURE: 65 MMHG | RESPIRATION RATE: 16 BRPM | BODY MASS INDEX: 26.88 KG/M2 | TEMPERATURE: 98.7 F | HEART RATE: 77 BPM

## 2023-03-28 DIAGNOSIS — E66.3 OVERWEIGHT WITH BODY MASS INDEX (BMI) OF 27 TO 27.9 IN ADULT: ICD-10-CM

## 2023-03-28 DIAGNOSIS — F41.9 ANXIETY: ICD-10-CM

## 2023-03-28 DIAGNOSIS — F32.9 MAJOR DEPRESSION, CHRONIC: Primary | ICD-10-CM

## 2023-03-28 DIAGNOSIS — F51.04 PSYCHOPHYSIOLOGICAL INSOMNIA: ICD-10-CM

## 2023-03-28 PROCEDURE — 99213 OFFICE O/P EST LOW 20 MIN: CPT | Performed by: FAMILY MEDICINE

## 2023-03-28 PROCEDURE — 3008F BODY MASS INDEX DOCD: CPT | Performed by: FAMILY MEDICINE

## 2023-03-28 PROCEDURE — 4004F PT TOBACCO SCREEN RCVD TLK: CPT | Performed by: FAMILY MEDICINE

## 2023-03-28 RX ORDER — RISPERIDONE 2 MG/1
2 TABLET ORAL NIGHTLY
COMMUNITY
End: 2023-03-28 | Stop reason: SDUPTHER

## 2023-03-28 RX ORDER — RISPERIDONE 2 MG/1
2 TABLET ORAL NIGHTLY
Qty: 30 TABLET | Refills: 2 | Status: SHIPPED | OUTPATIENT
Start: 2023-03-28 | End: 2023-04-19

## 2023-03-28 RX ORDER — MIRTAZAPINE 30 MG/1
30 TABLET, FILM COATED ORAL NIGHTLY
COMMUNITY
Start: 2023-03-06 | End: 2023-03-28 | Stop reason: SDUPTHER

## 2023-03-28 RX ORDER — MIRTAZAPINE 30 MG/1
30 TABLET, FILM COATED ORAL NIGHTLY
Qty: 30 TABLET | Refills: 2 | Status: SHIPPED | OUTPATIENT
Start: 2023-03-28 | End: 2023-04-19

## 2023-03-28 ASSESSMENT — PAIN SCALES - GENERAL: PAINLEVEL: 0-NO PAIN

## 2023-03-28 ASSESSMENT — LIFESTYLE VARIABLES
SKIP TO QUESTIONS 9-10: 1
HOW MANY STANDARD DRINKS CONTAINING ALCOHOL DO YOU HAVE ON A TYPICAL DAY: PATIENT DOES NOT DRINK
AUDIT-C TOTAL SCORE: 0
HOW OFTEN DO YOU HAVE A DRINK CONTAINING ALCOHOL: NEVER
HOW OFTEN DO YOU HAVE SIX OR MORE DRINKS ON ONE OCCASION: NEVER

## 2023-03-28 ASSESSMENT — PATIENT HEALTH QUESTIONNAIRE - PHQ9
1. LITTLE INTEREST OR PLEASURE IN DOING THINGS: SEVERAL DAYS
2. FEELING DOWN, DEPRESSED OR HOPELESS: SEVERAL DAYS
SUM OF ALL RESPONSES TO PHQ9 QUESTIONS 1 & 2: 2
10. IF YOU CHECKED OFF ANY PROBLEMS, HOW DIFFICULT HAVE THESE PROBLEMS MADE IT FOR YOU TO DO YOUR WORK, TAKE CARE OF THINGS AT HOME, OR GET ALONG WITH OTHER PEOPLE: VERY DIFFICULT

## 2023-03-28 NOTE — PROGRESS NOTES
"Subjective   Patient ID: Martha Obrien is a 37 y.o. female who presents for Med Management, Depression, and Anxiety. I last saw the patient on 02/09/2022.     HPI   Psychiatrist switched her medications.     She was in a psych segundo last month and had bloodwork done then.     Review of Systems  Except positives as noted in the CC & HPI      Constitutional: Denies fevers, chills, night sweats, fatigue, weight changes, change in appetite    Eyes: Denies blurry vision, double vision    ENT: Denies otalgia, trouble hearing, tinnitus, vertigo, nasal congestion, rhinorrhea, sore throat    Neck: Denies swelling, masses    Cardiovascular: Denies chest pain, palpitations, edema, orthopnea, syncope    Respiratory: Denies dyspnea, cough, wheezing, postural nocturnal dyspnea    Gastrointestinal: Denies abdominal pain, nausea, vomiting, diarrhea, constipation, melena, hematochezia    Genitourinary: Denies dysuria, hematuria, frequency, urgency    Musculoskeletal: Denies back pain, neck pain, arthralgias, myalgias    Integumentary: Denies skin lesions, rashes, masses    Neurological: Denies dizziness, headaches, confusion, limb weakness, paresthesias, syncope, convulsions    Psychiatric: Denies depression, anxiety, homicidal ideations, suicidal ideations, sleep disturbances    Endocrine: Denies polyphagia, polydipsia, polyuria, weakness, hair thinning, heat intolerance, cold intolerance, weight changes    Heme/Lymph: Denies easy bruising, easy bleeding, swollen glands     Objective   BP 97/65   Pulse 77   Temp 37.1 °C (98.7 °F) (Temporal)   Resp 16   Ht 1.765 m (5' 9.5\")   Wt 85.2 kg (187 lb 12.8 oz)   SpO2 98%   BMI 27.34 kg/m²     Physical Exam  Gen. Appearance - well-developed, well-nourished, 37 y.o., White female in no acute distress.     Skin - warm, pink and dry without rash or concerning lesions.    Mental Status - alert and oriented times 3. Normal mood and affect appropriate to mood.     Neck - supple without " lymphadenopathy. Carotid pulses are normal without bruits. Thyroid is normal in midline without nodules.    Chest - lungs are clear to auscultation without rales, rhonchi or wheezes.    Heart - regular, rate, and rhythm without murmurs, rubs or gallops.     Abdomen - soft, flat, nontender, nondistended. No masses, hepatomegaly or splenomegaly is noted. No rebound, rigidity or guarding is noted. Bowel sounds are normoactive.     Extremities - no cyanosis, clubbing or edema. Pedal pulses are 2+ normal at the dorsalis pedis and posterior tibial pulses bilaterally.     Neurological - cranial nerves II through XII are grossly intact. Motor strength 5/5 at all fours.     Assessment/Plan   1. Major depression, chronic        2. Anxiety        3. Psychophysiological insomnia        4. Overweight with body mass index (BMI) of 27 to 27.9 in adult        Patient to continue current medications (with any exceptions as noted) and diet. Follow-up in 3 month(s) otherwise as needed.      Patient was given refill(s) on:   Mirtazapine 30 mg, TAKE 1 TAB BY MOUTH DAILY AT BEDTIME   Risperidone 2 mg, TAKE 1 TAB BY MOUTH AT BEDTIME     Rx(s) sent to pharmacy.     Patient was offered behavioral therapy referral, but declined. Consider having Em see her here in the office. Will offer at her next visit.       Scribe Attestation  By signing my name below, ITanisha, Eyad   attest that this documentation has been prepared under the direction and in the presence of Thiago Cordon MD.

## 2023-03-28 NOTE — PATIENT INSTRUCTIONS
Patient to continue current medications (with any exceptions as noted) and diet. Follow-up in 3 month(s) otherwise as needed.      Patient was given refill(s) on:   Mirtazapine 30 mg, TAKE 1 TAB BY MOUTH DAILY AT BEDTIME   Risperidone 2 mg, TAKE 1 TAB BY MOUTH AT BEDTIME     Rx(s) sent to pharmacy.

## 2023-04-09 DIAGNOSIS — F32.9 MAJOR DEPRESSION, CHRONIC: Primary | ICD-10-CM

## 2023-04-09 DIAGNOSIS — F51.04 PSYCHOPHYSIOLOGICAL INSOMNIA: ICD-10-CM

## 2023-04-10 PROBLEM — T50.901A OVERDOSE: Status: ACTIVE | Noted: 2017-07-30

## 2023-04-10 PROBLEM — F17.200 SMOKER: Status: ACTIVE | Noted: 2017-07-30

## 2023-04-10 PROBLEM — F31.62 BIPOLAR DISORDER, CURRENT EPISODE MIXED, MODERATE (MULTI): Status: ACTIVE | Noted: 2018-07-03

## 2023-04-10 PROBLEM — F11.90 OPIATE USE: Status: ACTIVE | Noted: 2017-07-30

## 2023-04-10 PROBLEM — T50.902A SUICIDE ATTEMPT BY DRUG INGESTION (MULTI): Status: ACTIVE | Noted: 2017-08-24

## 2023-04-10 PROBLEM — T40.415A: Status: ACTIVE | Noted: 2017-07-30

## 2023-04-10 PROBLEM — G43.009 MIGRAINE WITHOUT AURA AND WITHOUT STATUS MIGRAINOSUS, NOT INTRACTABLE: Status: ACTIVE | Noted: 2018-07-03

## 2023-04-10 RX ORDER — QUETIAPINE FUMARATE 100 MG/1
TABLET, FILM COATED ORAL
COMMUNITY
Start: 2017-07-31 | End: 2023-04-11 | Stop reason: SDUPTHER

## 2023-04-11 RX ORDER — TRAZODONE HYDROCHLORIDE 100 MG/1
TABLET ORAL
Qty: 90 TABLET | Refills: 1 | Status: SHIPPED | OUTPATIENT
Start: 2023-04-11 | End: 2024-02-27 | Stop reason: WASHOUT

## 2023-04-11 RX ORDER — QUETIAPINE FUMARATE 50 MG/1
TABLET, FILM COATED ORAL
Qty: 90 TABLET | Refills: 1 | Status: SHIPPED | OUTPATIENT
Start: 2023-04-11 | End: 2023-07-10 | Stop reason: ALTCHOICE

## 2023-04-19 DIAGNOSIS — F32.9 MAJOR DEPRESSION, CHRONIC: ICD-10-CM

## 2023-04-19 DIAGNOSIS — F41.9 ANXIETY: ICD-10-CM

## 2023-04-19 RX ORDER — RISPERIDONE 2 MG/1
2 TABLET ORAL NIGHTLY
Qty: 30 TABLET | Refills: 2 | Status: SHIPPED | OUTPATIENT
Start: 2023-04-19 | End: 2023-08-18

## 2023-04-19 RX ORDER — MIRTAZAPINE 30 MG/1
30 TABLET, FILM COATED ORAL NIGHTLY
Qty: 30 TABLET | Refills: 2 | Status: SHIPPED | OUTPATIENT
Start: 2023-04-19 | End: 2023-09-04

## 2023-07-07 NOTE — PROGRESS NOTES
"Subjective   Patient ID: Martha Obrien is a 38 y.o. female who presents for Depression, Anxiety, and Insomnia.  I last saw the patient on 3/28/2023.     HPI   Patient states she wants to start Trazodone again. She notes that one of her medications is causing her to have RLS symptoms at night. She is unsure of which medication causes this, but notes it is a nightly occurrence. She is currently taking Risperidone and Mirtazapine.     Review of Systems  Except positives as noted in the CC & HPI      Constitutional: Denies fevers, chills, night sweats, fatigue, weight changes, change in appetite    Eyes: Denies blurry vision, double vision    ENT: Denies otalgia, trouble hearing, tinnitus, vertigo, nasal congestion, rhinorrhea, sore throat    Neck: Denies swelling, masses    Cardiovascular: Denies chest pain, palpitations, edema, orthopnea, syncope    Respiratory: Denies dyspnea, cough, wheezing, postural nocturnal dyspnea    Gastrointestinal: Denies abdominal pain, nausea, vomiting, diarrhea, constipation, melena, hematochezia    Genitourinary: Denies dysuria, hematuria, frequency, urgency    Musculoskeletal: Denies back pain, neck pain, arthralgias, myalgias    Integumentary: Denies skin lesions, rashes, masses    Neurological: Denies dizziness, headaches, confusion, limb weakness, paresthesias, syncope, convulsions    Psychiatric: Denies depression, anxiety, homicidal ideations, suicidal ideations, sleep disturbances    Endocrine: Denies polyphagia, polydipsia, polyuria, weakness, hair thinning, heat intolerance, cold intolerance, weight changes    Heme/Lymph: Denies easy bruising, easy bleeding, swollen glands     Objective   /68 (BP Location: Right arm, Patient Position: Sitting)   Pulse 79   Temp 36.1 °C (96.9 °F)   Resp 16   Ht 1.778 m (5' 10\")   Wt 83.9 kg (185 lb)   SpO2 95%   BMI 26.54 kg/m²     Physical Exam  104/68 on recheck of BP in the right arm.     Gen. Appearance - well-developed, " well-nourished, 38 y.o., White female in no acute distress.     Skin - warm, pink and dry without rash or concerning lesions.    Mental Status - alert and oriented times 3. Normal mood and affect appropriate to mood.     Neck - supple without lymphadenopathy. Carotid pulses are normal without bruits. Thyroid is normal in midline without nodules.    Chest - lungs are clear to auscultation without rales, rhonchi or wheezes.    Heart - regular, rate, and rhythm without murmurs, rubs or gallops.     Abdomen - soft, flat, nontender, nondistended. No masses, hepatomegaly or splenomegaly is noted. No rebound, rigidity or guarding is noted. Bowel sounds are normoactive.     Extremities - no cyanosis, clubbing or edema. Pedal pulses are 2+ normal at the dorsalis pedis and posterior tibial pulses bilaterally.     Neurological - cranial nerves II through XII are grossly intact. Motor strength 5/5 at all fours.    Assessment/Plan   1. Restless legs syndrome (RLS)  rOPINIRole (Requip) 0.25 mg tablet    Follow Up In [facility] - PCP - Established      2. Psychophysiological insomnia  Follow Up In [facility] - PCP    Follow Up In [facility] - PCP - Established      3. Anxiety  Follow Up In [facility] - PCP      4. Major depression, chronic  Follow Up In [facility] - PCP      5. Bipolar disorder, current episode mixed, moderate (CMS/HCC)        6. Overweight with body mass index (BMI) of 26 to 26.9 in adult        Patient to continue current medications (with any exceptions as noted) and diet. Follow-up in 2 month(s) otherwise as needed.      Patient was started on:   Ropinirole HCl 0.25 mg, TAKE 1/2 TAB 2-3 HOURS PRIOR TO BEDTIME FOR ONE WEEK THEN 1 WHOLE TAB THEREAFTER    Rx(s) sent to pharmacy.     Will try to obtain her last PAP smear results and copy into chart.       Scribe Attestation  By signing my name below, ITanisha, Eyad   attest that this documentation  has been prepared under the direction and in the presence of Thiago Cordon MD.

## 2023-07-10 ENCOUNTER — OFFICE VISIT (OUTPATIENT)
Dept: PRIMARY CARE | Facility: CLINIC | Age: 38
End: 2023-07-10
Payer: COMMERCIAL

## 2023-07-10 VITALS
DIASTOLIC BLOOD PRESSURE: 68 MMHG | OXYGEN SATURATION: 95 % | TEMPERATURE: 96.9 F | WEIGHT: 185 LBS | HEIGHT: 70 IN | RESPIRATION RATE: 16 BRPM | SYSTOLIC BLOOD PRESSURE: 104 MMHG | HEART RATE: 79 BPM | BODY MASS INDEX: 26.48 KG/M2

## 2023-07-10 DIAGNOSIS — E66.3 OVERWEIGHT WITH BODY MASS INDEX (BMI) OF 26 TO 26.9 IN ADULT: ICD-10-CM

## 2023-07-10 DIAGNOSIS — F32.9 MAJOR DEPRESSION, CHRONIC: ICD-10-CM

## 2023-07-10 DIAGNOSIS — F51.04 PSYCHOPHYSIOLOGICAL INSOMNIA: ICD-10-CM

## 2023-07-10 DIAGNOSIS — F41.9 ANXIETY: ICD-10-CM

## 2023-07-10 DIAGNOSIS — G25.81 RESTLESS LEGS SYNDROME (RLS): Primary | ICD-10-CM

## 2023-07-10 DIAGNOSIS — F31.62 BIPOLAR DISORDER, CURRENT EPISODE MIXED, MODERATE (MULTI): ICD-10-CM

## 2023-07-10 PROCEDURE — 99213 OFFICE O/P EST LOW 20 MIN: CPT | Performed by: FAMILY MEDICINE

## 2023-07-10 PROCEDURE — 4004F PT TOBACCO SCREEN RCVD TLK: CPT | Performed by: FAMILY MEDICINE

## 2023-07-10 PROCEDURE — 3008F BODY MASS INDEX DOCD: CPT | Performed by: FAMILY MEDICINE

## 2023-07-10 RX ORDER — ROPINIROLE 0.25 MG/1
TABLET, FILM COATED ORAL
Qty: 30 TABLET | Refills: 3 | Status: SHIPPED | OUTPATIENT
Start: 2023-07-10 | End: 2023-08-01

## 2023-07-10 ASSESSMENT — PATIENT HEALTH QUESTIONNAIRE - PHQ9
2. FEELING DOWN, DEPRESSED OR HOPELESS: NOT AT ALL
SUM OF ALL RESPONSES TO PHQ9 QUESTIONS 1 AND 2: 0
1. LITTLE INTEREST OR PLEASURE IN DOING THINGS: NOT AT ALL

## 2023-07-10 NOTE — PATIENT INSTRUCTIONS
Patient to continue current medications (with any exceptions as noted) and diet. Follow-up in 2 month(s) otherwise as needed.      Patient was started on:   Ropinirole HCl 0.25 mg, TAKE 1/2 TAB 2-3 HOURS PRIOR TO BEDTIME FOR ONE WEEK THEN 1 WHOLE TAB THEREAFTER    Rx(s) sent to pharmacy.     Will try to obtain her last PAP smear results and copy into chart.

## 2023-08-01 DIAGNOSIS — G25.81 RESTLESS LEGS SYNDROME (RLS): Primary | ICD-10-CM

## 2023-08-01 RX ORDER — ROPINIROLE 0.25 MG/1
TABLET, FILM COATED ORAL
Qty: 30 TABLET | Refills: 3 | Status: SHIPPED | OUTPATIENT
Start: 2023-08-01 | End: 2023-09-13

## 2023-08-14 ENCOUNTER — TELEPHONE (OUTPATIENT)
Dept: PRIMARY CARE | Facility: CLINIC | Age: 38
End: 2023-08-14
Payer: COMMERCIAL

## 2023-08-14 NOTE — TELEPHONE ENCOUNTER
Patient called in stating she went to [facility] and had an STI test done. She stated she is awaiting the results and is wondering if Dr. Cordon can prescribe her something for this if the result comes back positive as [facility] does not do treatment?  Please Advise

## 2023-08-18 DIAGNOSIS — F41.9 ANXIETY: ICD-10-CM

## 2023-08-18 RX ORDER — RISPERIDONE 2 MG/1
2 TABLET ORAL NIGHTLY
Qty: 90 TABLET | Refills: 1 | Status: SHIPPED | OUTPATIENT
Start: 2023-08-18 | End: 2024-02-27 | Stop reason: WASHOUT

## 2023-08-18 NOTE — TELEPHONE ENCOUNTER
Recent Visits  Date Type Provider Dept   07/10/23 Office Visit Thiago Cordon MD [facility] [id number]   03/28/23 [facility] Visit [facility] [place] [address] [facility]   Showing recent visits within past 540 days and meeting all other requirements  Future Appointments  No visits were found meeting these conditions.  Showing future appointments within next 180 days and meeting all other requirements

## 2023-08-29 ENCOUNTER — TELEPHONE (OUTPATIENT)
Dept: PRIMARY CARE | Facility: CLINIC | Age: 38
End: 2023-08-29
Payer: COMMERCIAL

## 2023-08-29 NOTE — TELEPHONE ENCOUNTER
Patient called stating she is wanting to stop taking risperidone and change back to effexor. Patient stated she was previously prescribed effexor xr 150mg   Please advise   [facility]

## 2023-08-31 DIAGNOSIS — F32.9 MAJOR DEPRESSION, CHRONIC: ICD-10-CM

## 2023-09-04 RX ORDER — MIRTAZAPINE 30 MG/1
30 TABLET, FILM COATED ORAL NIGHTLY
Qty: 90 TABLET | Refills: 1 | Status: SHIPPED | OUTPATIENT
Start: 2023-09-04 | End: 2023-09-13 | Stop reason: ALTCHOICE

## 2023-09-13 ENCOUNTER — OFFICE VISIT (OUTPATIENT)
Dept: PRIMARY CARE | Facility: CLINIC | Age: 38
End: 2023-09-13
Payer: COMMERCIAL

## 2023-09-13 VITALS
DIASTOLIC BLOOD PRESSURE: 72 MMHG | HEIGHT: 70 IN | WEIGHT: 185 LBS | HEART RATE: 70 BPM | TEMPERATURE: 97.7 F | OXYGEN SATURATION: 99 % | SYSTOLIC BLOOD PRESSURE: 110 MMHG | RESPIRATION RATE: 16 BRPM | BODY MASS INDEX: 26.48 KG/M2

## 2023-09-13 DIAGNOSIS — F32.9 MAJOR DEPRESSION, CHRONIC: ICD-10-CM

## 2023-09-13 DIAGNOSIS — R79.89 ELEVATED TSH: ICD-10-CM

## 2023-09-13 DIAGNOSIS — E66.3 OVERWEIGHT WITH BODY MASS INDEX (BMI) OF 26 TO 26.9 IN ADULT: ICD-10-CM

## 2023-09-13 DIAGNOSIS — F41.9 ANXIETY: Primary | ICD-10-CM

## 2023-09-13 PROCEDURE — 3008F BODY MASS INDEX DOCD: CPT | Performed by: FAMILY MEDICINE

## 2023-09-13 PROCEDURE — 99213 OFFICE O/P EST LOW 20 MIN: CPT | Performed by: FAMILY MEDICINE

## 2023-09-13 PROCEDURE — 4004F PT TOBACCO SCREEN RCVD TLK: CPT | Performed by: FAMILY MEDICINE

## 2023-09-13 RX ORDER — VENLAFAXINE HYDROCHLORIDE 37.5 MG/1
37.5 CAPSULE, EXTENDED RELEASE ORAL DAILY
Qty: 7 CAPSULE | Refills: 0 | Status: SHIPPED | OUTPATIENT
Start: 2023-09-13 | End: 2024-02-27 | Stop reason: DRUGHIGH

## 2023-09-13 RX ORDER — VENLAFAXINE HYDROCHLORIDE 75 MG/1
75 CAPSULE, EXTENDED RELEASE ORAL DAILY
Qty: 30 CAPSULE | Refills: 3 | Status: SHIPPED | OUTPATIENT
Start: 2023-09-13 | End: 2023-10-05

## 2023-09-13 ASSESSMENT — ANXIETY QUESTIONNAIRES
1. FEELING NERVOUS, ANXIOUS, OR ON EDGE: NEARLY EVERY DAY
3. WORRYING TOO MUCH ABOUT DIFFERENT THINGS: NEARLY EVERY DAY
7. FEELING AFRAID AS IF SOMETHING AWFUL MIGHT HAPPEN: NEARLY EVERY DAY
IF YOU CHECKED OFF ANY PROBLEMS ON THIS QUESTIONNAIRE, HOW DIFFICULT HAVE THESE PROBLEMS MADE IT FOR YOU TO DO YOUR WORK, TAKE CARE OF THINGS AT HOME, OR GET ALONG WITH OTHER PEOPLE: VERY DIFFICULT
6. BECOMING EASILY ANNOYED OR IRRITABLE: NEARLY EVERY DAY
2. NOT BEING ABLE TO STOP OR CONTROL WORRYING: NEARLY EVERY DAY
GAD7 TOTAL SCORE: 21
4. TROUBLE RELAXING: NEARLY EVERY DAY
5. BEING SO RESTLESS THAT IT IS HARD TO SIT STILL: NEARLY EVERY DAY

## 2023-09-13 NOTE — PATIENT INSTRUCTIONS
Patient to continue current medications (with any exceptions as noted) and diet. Follow-up in 4-5 week(s) otherwise as needed.      Will obtain TSH today. Will call patient with results when available.     Patient is to STOP taking Mirtazapine.     Patient was started on:   Venlafaxine HCl ER 37.5 mg, TAKE 1 CAPSULE BY MOUTH DAILY FOR ONE WEEK   Venlafaxine HCl ER 75 mg, TAKE 1 CAPSULE BY MOUTH DAILY AFTER TAKING 37.5 MG FOR ONE WEEK    Rx(s) sent to pharmacy.     Patient declines flu vaccine.

## 2023-09-13 NOTE — PROGRESS NOTES
"Subjective   Patient ID: Martha Obrien is a 38 y.o. female who presents for Med Management, Anxiety, and Depression. I last saw the patient on 7/10/2023.     HPI   Patient would like to get back on her Effexor. She does not like the Remeron because she feels like it made her depression worse. Last dose was 5 days ago.     She mentions that the Ropinirole made her RLS seem worse.     Patient also states that her anxiety has been heightened. Onset a couple weeks ago. She adds that she is taking care of her grandmother, who is on Hospice with heart and kidney failure.     Review of Systems  Except positives as noted in the CC & HPI      Constitutional: Denies fevers, chills, night sweats, fatigue, weight changes, change in appetite    Eyes: Denies blurry vision, double vision    ENT: Denies otalgia, trouble hearing, tinnitus, vertigo, nasal congestion, rhinorrhea, sore throat    Neck: Denies swelling, masses    Cardiovascular: Denies chest pain, palpitations, edema, orthopnea, syncope    Respiratory: Denies dyspnea, cough, wheezing, postural nocturnal dyspnea    Gastrointestinal: Denies abdominal pain, nausea, vomiting, diarrhea, constipation, melena, hematochezia    Genitourinary: Denies dysuria, hematuria, frequency, urgency    Musculoskeletal: Denies back pain, neck pain, arthralgias, myalgias    Integumentary: Denies skin lesions, rashes, masses    Neurological: Denies dizziness, headaches, confusion, limb weakness, paresthesias, syncope, convulsions    Psychiatric: Denies homicidal ideations, suicidal ideations, sleep disturbances    Endocrine: Denies polyphagia, polydipsia, polyuria, weakness, hair thinning, heat intolerance, cold intolerance, weight changes    Heme/Lymph: Denies easy bruising, easy bleeding, swollen glands     Objective   /72   Pulse 70   Temp 36.5 °C (97.7 °F)   Resp 16   Ht 1.778 m (5' 10\")   Wt 83.9 kg (185 lb)   SpO2 99%   BMI 26.54 kg/m²     Physical Exam  Gen. Appearance - " well-developed, well-nourished, 38 y.o., White female in no acute distress.     Skin - warm, pink and dry without rash or concerning lesions.    Mental Status - alert and oriented times 3. Normal mood and affect appropriate to mood.     Neck - supple without lymphadenopathy. Carotid pulses are normal without bruits. Thyroid is normal in midline without nodules.    Chest - lungs are clear to auscultation without rales, rhonchi or wheezes.    Heart - regular, rate, and rhythm without murmurs, rubs or gallops.     Assessment/Plan   1. Anxiety  venlafaxine XR (Effexor-XR) 37.5 mg 24 hr capsule    venlafaxine XR (Effexor-XR) 75 mg 24 hr capsule    Follow Up In [facility] - PCP - Established      2. Major depression, chronic  Follow Up In [facility] - PCP - Established      3. Elevated TSH  TSH with reflex to Free T4 if abnormal      4. Overweight with body mass index (BMI) of 26 to 26.9 in adult        Patient to continue current medications (with any exceptions as noted) and diet. Follow-up in 4-5 week(s) otherwise as needed.      Will obtain TSH today. Will call patient with results when available.     Patient is to STOP taking Mirtazapine.     Patient was started on:   Venlafaxine HCl ER 37.5 mg, TAKE 1 CAPSULE BY MOUTH DAILY FOR ONE WEEK   Venlafaxine HCl ER 75 mg, TAKE 1 CAPSULE BY MOUTH DAILY AFTER TAKING 37.5 MG FOR ONE WEEK    Rx(s) sent to pharmacy.     Patient declines flu vaccine.         Scribe Attestation  By signing my name below, ITanisha Scribe   attest that this documentation has been prepared under the direction and in the presence of Thiago Cordon MD.

## 2023-10-05 DIAGNOSIS — F41.9 ANXIETY: ICD-10-CM

## 2023-10-05 DIAGNOSIS — F32.9 MAJOR DEPRESSION, CHRONIC: Primary | ICD-10-CM

## 2023-10-05 RX ORDER — VENLAFAXINE HYDROCHLORIDE 75 MG/1
CAPSULE, EXTENDED RELEASE ORAL
Qty: 90 CAPSULE | Refills: 2 | Status: SHIPPED | OUTPATIENT
Start: 2023-10-05 | End: 2024-02-27 | Stop reason: DRUGHIGH

## 2023-10-05 NOTE — TELEPHONE ENCOUNTER
Rx Refill Request Telephone Encounter    Name:  Martha Obrien  :  [id number]  Medication Name:  Effexor XR 75mg  Specific Pharmacy location:  [facility] in [place]   Date of last appointment:  2023  Date of next appointment:  10/09/2023  Best number to reach patient:  [phone]

## 2023-10-06 NOTE — PROGRESS NOTES
No chief complaint on file.      HPI: Martha Obrien is a 38 y.o. female presenting for follow-u.  I last saw the patient on 9/13/2023 .    ROS    Except positives as noted in the CC & HPI   Constitutional: Denies fevers, chills, night sweats, fatigue, weight changes, change in appetite   Eyes: Denies blurry vision, double vision   ENT: Denies otalgia, trouble hearing, tinnitus, vertigo, nasal congestion, rhinorrhea, sore throat   Neck: Denies swelling, masses   Cardiovascular: Denies chest pain, palpitations, edema, orthopnea, syncope   Respiratory: Denies dyspnea, cough, wheezing, postural nocturnal dyspnea   Gastrointestinal: Denies abdominal pain, nausea, vomiting, diarrhea, constipation, melena, hematochezia   Genitourinary: Denies dysuria, hematuria, frequency, urgency   Musculoskeletal: Denies back pain, neck pain, arthralgias, myalgias   Integumentary: Denies skin lesions, rashes, masses   Neurological: Denies dizziness, headaches, confusion, limb weakness, paresthesias, syncope, convulsions   Psychiatric: Denies depression, anxiety, homicidal ideations, suicidal ideations, sleep disturbances   Endocrine: Denies polyphagia, polydipsia, polyuria, weakness, hair thinning, heat intolerance, cold intolerance, weight changes   Heme/Lymph: Denies easy bruising, easy bleeding, swollen glands     There were no vitals filed for this visit.    PHYSICAL EXAM    GENERAL APPEARANCE: well-developed, well-nourished, 38 y.o. female in no acute distress.  SKIN: warm, pink and dry without rash or concerning lesions.  MENTAL STATUS: alert and oriented × 3. Normal mood and affect appropriate to mood.  EARS: TMs shiny and move well with insufflation. Ear canals are clear bilaterally.  NECK: supple without lymphadenopathy. Carotid pulses are normal without bruits. Thyroid - is normal in midline without nodules.  CHEST: lungs are clear to auscultation without rales, rhonchi or wheezes.  HEART: regular, rate and rhythm without  murmurs, rubs or gallops.  ABDOMEN: soft, flat, nondistended. No masses, hepatomegaly or splenomegaly is noted.  EXTREMITIES: no cyanosis, clubbing or edema. Pedal pulses are 2+ normal at the dorsalis pedis and posterior tibial pulses bilaterally.  NEUROLOGICAL: cranial nerves II through XII are grossly intact. Motor strength 5/5 at all fours. DTRs are 2+ normal at all fours bilaterally and symmetrically.      Below is the patient's most recent value for Albumin, ALT, AST, BUN, Calcium, Chloride, Cholesterol, CO2, Creatinine, GFR, Glucose, HDL, Hematocrit, Hemoglobin, Hemoglobin A1C, LDL, Magnesium, Phosphorus, Platelets, Potassium, PSA, Sodium, Triglycerides, and WBC.   Lab Results   Component Value Date    ALBUMIN 3.9 02/27/2023    ALT 27 02/27/2023    AST 52 (H) 02/27/2023    BUN 14 02/27/2023    CALCIUM 9.4 02/27/2023     02/27/2023    CHOL 158 02/08/2022    CO2 25 02/27/2023    CREATININE 0.50 02/27/2023    HDL 50.0 02/08/2022    HCT 36.2 02/27/2023    HGB 12.0 02/27/2023    MG 1.75 02/27/2023    PHOS 3.9 02/26/2023     02/27/2023    K 3.8 02/27/2023     02/27/2023    TRIG 88 02/08/2022    WBC 7.3 02/27/2023         ASSESSMENT:  No diagnosis found.    PLAN:  No orders of the defined types were placed in this encounter.      [unfilled]    I have instructed the patient to follow-up with me in *** months or sooner if needed.

## 2023-10-09 ENCOUNTER — APPOINTMENT (OUTPATIENT)
Dept: PRIMARY CARE | Facility: CLINIC | Age: 38
End: 2023-10-09
Payer: COMMERCIAL

## 2023-12-13 ENCOUNTER — APPOINTMENT (OUTPATIENT)
Dept: PRIMARY CARE | Facility: CLINIC | Age: 38
End: 2023-12-13
Payer: COMMERCIAL

## 2024-01-09 ENCOUNTER — APPOINTMENT (OUTPATIENT)
Dept: OBSTETRICS AND GYNECOLOGY | Facility: CLINIC | Age: 39
End: 2024-01-09
Payer: COMMERCIAL

## 2024-01-22 ENCOUNTER — APPOINTMENT (OUTPATIENT)
Dept: OBSTETRICS AND GYNECOLOGY | Facility: CLINIC | Age: 39
End: 2024-01-22
Payer: COMMERCIAL

## 2024-02-27 ENCOUNTER — OFFICE VISIT (OUTPATIENT)
Dept: PRIMARY CARE | Facility: CLINIC | Age: 39
End: 2024-02-27
Payer: COMMERCIAL

## 2024-02-27 VITALS
BODY MASS INDEX: 22.48 KG/M2 | HEIGHT: 70 IN | DIASTOLIC BLOOD PRESSURE: 82 MMHG | SYSTOLIC BLOOD PRESSURE: 106 MMHG | HEART RATE: 82 BPM | RESPIRATION RATE: 17 BRPM | TEMPERATURE: 98.3 F | WEIGHT: 157 LBS

## 2024-02-27 DIAGNOSIS — R87.810 CERVICAL HIGH RISK HPV (HUMAN PAPILLOMAVIRUS) TEST POSITIVE: ICD-10-CM

## 2024-02-27 DIAGNOSIS — F31.62 BIPOLAR DISORDER, CURRENT EPISODE MIXED, MODERATE (MULTI): ICD-10-CM

## 2024-02-27 DIAGNOSIS — F51.04 PSYCHOPHYSIOLOGICAL INSOMNIA: ICD-10-CM

## 2024-02-27 DIAGNOSIS — L73.9 FOLLICULITIS OF RIGHT AXILLA: ICD-10-CM

## 2024-02-27 DIAGNOSIS — F41.9 ANXIETY: Primary | ICD-10-CM

## 2024-02-27 DIAGNOSIS — F32.9 MAJOR DEPRESSION, CHRONIC: ICD-10-CM

## 2024-02-27 DIAGNOSIS — Z13.220 LIPID SCREENING: ICD-10-CM

## 2024-02-27 PROBLEM — F12.90 MARIJUANA USE: Status: ACTIVE | Noted: 2024-01-03

## 2024-02-27 PROBLEM — O99.810 ABNORMAL MATERNAL GLUCOSE TOLERANCE, COMPLICATING PREGNANCY (HHS-HCC): Status: RESOLVED | Noted: 2023-03-22 | Resolved: 2024-02-27

## 2024-02-27 PROCEDURE — 3008F BODY MASS INDEX DOCD: CPT | Performed by: FAMILY MEDICINE

## 2024-02-27 PROCEDURE — 99214 OFFICE O/P EST MOD 30 MIN: CPT | Performed by: FAMILY MEDICINE

## 2024-02-27 RX ORDER — VENLAFAXINE HYDROCHLORIDE 150 MG/1
150 CAPSULE, EXTENDED RELEASE ORAL DAILY
Qty: 90 CAPSULE | Refills: 1 | Status: SHIPPED | OUTPATIENT
Start: 2024-02-27 | End: 2024-08-25

## 2024-02-27 RX ORDER — TRAZODONE HYDROCHLORIDE 100 MG/1
100 TABLET ORAL NIGHTLY
Qty: 90 TABLET | Refills: 1 | Status: SHIPPED | OUTPATIENT
Start: 2024-02-27 | End: 2024-05-21 | Stop reason: SDUPTHER

## 2024-02-27 NOTE — PATIENT INSTRUCTIONS
Patient to continue current medications (with any exceptions as noted) and diet. Follow-up in 3 month(s) otherwise as needed.     Patient is to return for fasting CBC, CMP, lipid panel, TSH labs at their convenience prior to her next appointment. Fasting is nothing to eat or drink except water or black coffee for 8-12 hours. Will call patient with results when available.     Increase Venlafaxine to 150 mg.     Patient was given refill(s) on:   Venlafaxine HCl  mg, TAKE 1 CAPSULE BY MOUTH DAILY   Trazodone HCl 100 mg, TAKE 1/2 TAB BY MOUTH AT BEDTIME FOR 1 WEEK THEN INCREASE TO 1 WHOLE TAB AS NEEDED     Rx(s) sent to pharmacy.     Patient is to follow up with Dr. Joe (GYN) as scheduled.

## 2024-02-27 NOTE — PROGRESS NOTES
Subjective   Patient ID: Martha Obrien is a 38 y.o. female who presents for Follow-up. I last saw the patient on 9/13/2023.     HPI   She mentions that she went and got her PAP smear done. It came back abnormal with HPV. She had a biopsy done with the same result. She is scheduled to see Dr. Joe in March.     She notes that she had boils or bumps under her arms that continued to come back. She adds that since making her appt a few weeks ago, they have not returned. She states that they appeared to be about the size of a grape and would burst.     She notes that she does not sleep well, but takes OTC sleep medication. It will help her fall asleep, but not stay asleep.     Venlafaxine is working well for her.     Review of Systems  Except positives as noted in the CC & HPI      Constitutional: Denies fevers, chills, night sweats, fatigue, weight changes, change in appetite    Eyes: Denies blurry vision, double vision    ENT: Denies otalgia, trouble hearing, tinnitus, vertigo, nasal congestion, rhinorrhea, sore throat    Neck: Denies swelling, masses    Cardiovascular: Denies chest pain, palpitations, edema, orthopnea, syncope    Respiratory: Denies dyspnea, cough, wheezing, postural nocturnal dyspnea    Gastrointestinal: Denies abdominal pain, nausea, vomiting, diarrhea, constipation, melena, hematochezia    Genitourinary: Denies dysuria, hematuria, frequency, urgency    Musculoskeletal: Denies back pain, neck pain, arthralgias, myalgias    Integumentary: Denies skin lesions, rashes, masses    Neurological: Denies dizziness, headaches, confusion, limb weakness, paresthesias, syncope, convulsions    Psychiatric: Denies depression, anxiety, homicidal ideations, suicidal ideations   Endocrine: Denies polyphagia, polydipsia, polyuria, weakness, hair thinning, heat intolerance, cold intolerance, weight changes    Heme/Lymph: Denies easy bruising, easy bleeding, swollen glands    Objective   /82   Pulse 82   Temp  "36.8 °C (98.3 °F)   Resp 17   Ht 1.778 m (5' 10\")   Wt 71.2 kg (157 lb)   BMI 22.53 kg/m²     Physical Exam  Gen. Appearance - well-developed, well-nourished, 38 y.o., White female in no acute distress.     Skin - warm, pink and dry without rash or concerning lesions. Right axilla reveals a few hyperpigmented areas of skin from previous infection.     Mental Status - alert and oriented times 3. Normal mood and affect appropriate to mood.     Neck - supple without lymphadenopathy. Carotid pulses are normal without bruits. Thyroid is normal in midline without nodules.    Chest - lungs are clear to auscultation without rales, rhonchi or wheezes.    Heart - regular, rate, and rhythm without murmurs, rubs or gallops.     Abdomen - soft, flat, nontender, nondistended. No masses, hepatomegaly or splenomegaly is noted. No rebound, rigidity or guarding is noted. Bowel sounds are normoactive.     Extremities - no cyanosis, clubbing or edema. Pedal pulses are 2+ normal at the dorsalis pedis and posterior tibial pulses bilaterally.     Neurological - cranial nerves II through XII are grossly intact. Motor strength 5/5 at all fours.     Assessment/Plan   1. Anxiety  venlafaxine XR (Effexor-XR) 150 mg 24 hr capsule    Follow Up In [facility] - PCP - Established      2. Bipolar disorder, current episode mixed, moderate (CMS/HCC)        3. Major depression, chronic  venlafaxine XR (Effexor-XR) 150 mg 24 hr capsule      4. Psychophysiological insomnia  traZODone (Desyrel) 100 mg tablet    Follow Up In [facility] - PCP - Established      5. Folliculitis of right axilla        6. Cervical high risk HPV (human papillomavirus) test positive        7. Body mass index (BMI) of 22.0 to 22.9 in adult  CBC and Auto Differential    Comprehensive Metabolic Panel      8. Lipid screening  Lipid Panel      Patient to continue current medications (with any exceptions as noted) and diet. Follow-up in 3 month(s) otherwise " as needed.     Patient is to return for fasting CBC, CMP, lipid panel, TSH labs at their convenience prior to her next appointment. Fasting is nothing to eat or drink except water or black coffee for 8-12 hours. Will call patient with results when available.     Increase Venlafaxine to 150 mg.     Patient was given refill(s) on:   Venlafaxine HCl  mg, TAKE 1 CAPSULE BY MOUTH DAILY   Trazodone HCl 100 mg, TAKE 1/2 TAB BY MOUTH AT BEDTIME FOR 1 WEEK THEN INCREASE TO 1 WHOLE TAB AS NEEDED     Rx(s) sent to pharmacy.     Patient is to follow up with Dr. Joe (GYN) as scheduled.         Scribe Attestation  By signing my name below, ITanisha Scribe   attest that this documentation has been prepared under the direction and in the presence of Thiago Cordon MD.

## 2024-04-02 ENCOUNTER — TELEPHONE (OUTPATIENT)
Dept: PRIMARY CARE | Facility: CLINIC | Age: 39
End: 2024-04-02
Payer: COMMERCIAL

## 2024-04-02 NOTE — TELEPHONE ENCOUNTER
Patient called in stating she had a hysterectomy 4 days ago. She stated she was only given enough pain medication to last her for 3 days. Patient is wondering if Dr. Cordon could call her in anything to help with the pain?  [facility]  Please Advise

## 2024-05-03 ENCOUNTER — LAB (OUTPATIENT)
Dept: LAB | Facility: LAB | Age: 39
End: 2024-05-03
Payer: COMMERCIAL

## 2024-05-03 DIAGNOSIS — Z13.220 LIPID SCREENING: ICD-10-CM

## 2024-05-03 DIAGNOSIS — R79.89 ELEVATED TSH: ICD-10-CM

## 2024-05-03 LAB
ALBUMIN SERPL BCP-MCNC: 4.3 G/DL (ref 3.4–5)
ALP SERPL-CCNC: 63 U/L (ref 33–110)
ALT SERPL W P-5'-P-CCNC: 7 U/L (ref 7–45)
ANION GAP SERPL CALC-SCNC: 9 MMOL/L (ref 10–20)
AST SERPL W P-5'-P-CCNC: 12 U/L (ref 9–39)
BASOPHILS # BLD AUTO: 0.03 X10*3/UL (ref 0–0.1)
BASOPHILS NFR BLD AUTO: 0.6 %
BILIRUB SERPL-MCNC: 1.3 MG/DL (ref 0–1.2)
BUN SERPL-MCNC: 19 MG/DL (ref 6–23)
CALCIUM SERPL-MCNC: 9.5 MG/DL (ref 8.6–10.3)
CHLORIDE SERPL-SCNC: 106 MMOL/L (ref 98–107)
CHOLEST SERPL-MCNC: 164 MG/DL (ref 0–199)
CHOLESTEROL/HDL RATIO: 3.8
CO2 SERPL-SCNC: 29 MMOL/L (ref 21–32)
CREAT SERPL-MCNC: 0.73 MG/DL (ref 0.5–1.05)
EGFRCR SERPLBLD CKD-EPI 2021: >90 ML/MIN/1.73M*2
EOSINOPHIL # BLD AUTO: 0.22 X10*3/UL (ref 0–0.7)
EOSINOPHIL NFR BLD AUTO: 4 %
ERYTHROCYTE [DISTWIDTH] IN BLOOD BY AUTOMATED COUNT: 13.6 % (ref 11.5–14.5)
GLUCOSE SERPL-MCNC: 83 MG/DL (ref 74–99)
HCT VFR BLD AUTO: 39.5 % (ref 36–46)
HDLC SERPL-MCNC: 43 MG/DL
HGB BLD-MCNC: 12.9 G/DL (ref 12–16)
IMM GRANULOCYTES # BLD AUTO: 0.01 X10*3/UL (ref 0–0.7)
IMM GRANULOCYTES NFR BLD AUTO: 0.2 % (ref 0–0.9)
LDLC SERPL CALC-MCNC: 111 MG/DL
LYMPHOCYTES # BLD AUTO: 2.05 X10*3/UL (ref 1.2–4.8)
LYMPHOCYTES NFR BLD AUTO: 37.7 %
MCH RBC QN AUTO: 32.1 PG (ref 26–34)
MCHC RBC AUTO-ENTMCNC: 32.7 G/DL (ref 32–36)
MCV RBC AUTO: 98 FL (ref 80–100)
MONOCYTES # BLD AUTO: 0.45 X10*3/UL (ref 0.1–1)
MONOCYTES NFR BLD AUTO: 8.3 %
NEUTROPHILS # BLD AUTO: 2.68 X10*3/UL (ref 1.2–7.7)
NEUTROPHILS NFR BLD AUTO: 49.2 %
NON HDL CHOLESTEROL: 121 MG/DL (ref 0–149)
NRBC BLD-RTO: 0 /100 WBCS (ref 0–0)
PLATELET # BLD AUTO: 299 X10*3/UL (ref 150–450)
POTASSIUM SERPL-SCNC: 4 MMOL/L (ref 3.5–5.3)
PROT SERPL-MCNC: 7.1 G/DL (ref 6.4–8.2)
RBC # BLD AUTO: 4.02 X10*6/UL (ref 4–5.2)
SODIUM SERPL-SCNC: 140 MMOL/L (ref 136–145)
TRIGL SERPL-MCNC: 51 MG/DL (ref 0–149)
TSH SERPL-ACNC: 2.01 MIU/L (ref 0.44–3.98)
VLDL: 10 MG/DL (ref 0–40)
WBC # BLD AUTO: 5.4 X10*3/UL (ref 4.4–11.3)

## 2024-05-03 PROCEDURE — 85025 COMPLETE CBC W/AUTO DIFF WBC: CPT

## 2024-05-03 PROCEDURE — 84443 ASSAY THYROID STIM HORMONE: CPT

## 2024-05-03 PROCEDURE — 80053 COMPREHEN METABOLIC PANEL: CPT

## 2024-05-03 PROCEDURE — 80061 LIPID PANEL: CPT

## 2024-05-03 PROCEDURE — 36415 COLL VENOUS BLD VENIPUNCTURE: CPT

## 2024-05-21 ENCOUNTER — OFFICE VISIT (OUTPATIENT)
Dept: PRIMARY CARE | Facility: CLINIC | Age: 39
End: 2024-05-21
Payer: COMMERCIAL

## 2024-05-21 VITALS
HEART RATE: 71 BPM | SYSTOLIC BLOOD PRESSURE: 101 MMHG | HEIGHT: 70 IN | WEIGHT: 145 LBS | TEMPERATURE: 97.5 F | RESPIRATION RATE: 16 BRPM | OXYGEN SATURATION: 97 % | BODY MASS INDEX: 20.76 KG/M2 | DIASTOLIC BLOOD PRESSURE: 65 MMHG

## 2024-05-21 DIAGNOSIS — F32.9 MAJOR DEPRESSION, CHRONIC: ICD-10-CM

## 2024-05-21 DIAGNOSIS — F31.62 BIPOLAR DISORDER, CURRENT EPISODE MIXED, MODERATE (MULTI): ICD-10-CM

## 2024-05-21 DIAGNOSIS — F51.04 PSYCHOPHYSIOLOGICAL INSOMNIA: Primary | ICD-10-CM

## 2024-05-21 DIAGNOSIS — F41.9 ANXIETY: ICD-10-CM

## 2024-05-21 PROCEDURE — 1036F TOBACCO NON-USER: CPT | Performed by: FAMILY MEDICINE

## 2024-05-21 PROCEDURE — 99213 OFFICE O/P EST LOW 20 MIN: CPT | Performed by: FAMILY MEDICINE

## 2024-05-21 PROCEDURE — 3008F BODY MASS INDEX DOCD: CPT | Performed by: FAMILY MEDICINE

## 2024-05-21 RX ORDER — TRAZODONE HYDROCHLORIDE 150 MG/1
150 TABLET ORAL NIGHTLY
Qty: 90 TABLET | Refills: 1 | Status: SHIPPED | OUTPATIENT
Start: 2024-05-21 | End: 2024-11-17

## 2024-05-21 RX ORDER — TRAZODONE HYDROCHLORIDE 150 MG/1
150 TABLET ORAL NIGHTLY
Qty: 90 TABLET | Refills: 1 | Status: SHIPPED | OUTPATIENT
Start: 2024-05-21 | End: 2024-05-21

## 2024-05-21 RX ORDER — OXYCODONE HYDROCHLORIDE 5 MG/1
TABLET ORAL
COMMUNITY
Start: 2024-03-29

## 2024-05-21 RX ORDER — POLYETHYLENE GLYCOL 3350 17 G/17G
17 POWDER, FOR SOLUTION ORAL
COMMUNITY
Start: 2024-03-29

## 2024-05-21 RX ORDER — DOCUSATE SODIUM 100 MG/1
100 CAPSULE, LIQUID FILLED ORAL EVERY 12 HOURS PRN
COMMUNITY
Start: 2024-03-29

## 2024-05-21 ASSESSMENT — PATIENT HEALTH QUESTIONNAIRE - PHQ9
2. FEELING DOWN, DEPRESSED OR HOPELESS: NOT AT ALL
SUM OF ALL RESPONSES TO PHQ9 QUESTIONS 1 AND 2: 0
1. LITTLE INTEREST OR PLEASURE IN DOING THINGS: NOT AT ALL
1. LITTLE INTEREST OR PLEASURE IN DOING THINGS: NOT AT ALL
2. FEELING DOWN, DEPRESSED OR HOPELESS: NOT AT ALL
SUM OF ALL RESPONSES TO PHQ9 QUESTIONS 1 AND 2: 0

## 2024-05-21 ASSESSMENT — ANXIETY QUESTIONNAIRES
4. TROUBLE RELAXING: NOT AT ALL
GAD7 TOTAL SCORE: 3
6. BECOMING EASILY ANNOYED OR IRRITABLE: NOT AT ALL
3. WORRYING TOO MUCH ABOUT DIFFERENT THINGS: NOT AT ALL
IF YOU CHECKED OFF ANY PROBLEMS ON THIS QUESTIONNAIRE, HOW DIFFICULT HAVE THESE PROBLEMS MADE IT FOR YOU TO DO YOUR WORK, TAKE CARE OF THINGS AT HOME, OR GET ALONG WITH OTHER PEOPLE: NOT DIFFICULT AT ALL
2. NOT BEING ABLE TO STOP OR CONTROL WORRYING: NOT AT ALL
5. BEING SO RESTLESS THAT IT IS HARD TO SIT STILL: NEARLY EVERY DAY
7. FEELING AFRAID AS IF SOMETHING AWFUL MIGHT HAPPEN: NOT AT ALL
1. FEELING NERVOUS, ANXIOUS, OR ON EDGE: NOT AT ALL

## 2024-05-21 NOTE — PATIENT INSTRUCTIONS
Patient to continue current medications (with any exceptions as noted) and diet. Follow-up in 3 month(s) otherwise as needed.     Patient was given refill(s) on:   Trazodone HCl 150 mg, TAKE 1 TAB BY MOUTH AT BEDTIME AS NEEDED    Rx(s) sent to pharmacy.

## 2024-05-21 NOTE — PROGRESS NOTES
"Chief Complaint   Patient presents with    Follow-up     HPI: Martha Obrien is a 38 y.o. female presenting for follow-up of Follow-up  . I last saw the patient 2/27/2024.     Patient is here for a follow up. She is taking medications as directed.     She wants to see if she can have her trazadone dosage upped. She says it will put her to sleep but she does not stay asleep with the dosage she is on.    Patient has lab results to be reviewed from 05/03.    Patient states she does get headaches a lot still.   ROS  Except positives as noted in the CC & HPI   Constitutional: Denies fevers, chills, night sweats, fatigue, weight changes, change in appetite   Eyes: Denies blurry vision, double vision   ENT: Denies otalgia, trouble hearing, tinnitus, vertigo, nasal congestion, rhinorrhea, sore throat   Neck: Denies swelling, masses   Cardiovascular: Denies chest pain, palpitations, edema, orthopnea, syncope   Respiratory: Denies dyspnea, cough, wheezing, postural nocturnal dyspnea   Gastrointestinal: Denies abdominal pain, nausea, vomiting, diarrhea, constipation, melena, hematochezia   Genitourinary: Denies dysuria, hematuria, frequency, urgency   Musculoskeletal: Denies back pain, neck pain, arthralgias, myalgias   Integumentary: Denies skin lesions, rashes, masses   Neurological: Denies dizziness, confusion, limb weakness, paresthesias, syncope, convulsions   Psychiatric: Denies depression, anxiety, homicidal ideations, suicidal ideations, sleep disturbances   Endocrine: Denies polyphagia, polydipsia, polyuria, weakness, hair thinning, heat intolerance, cold intolerance, weight changes   Heme/Lymph: Denies easy bruising, easy bleeding, swollen glands     Vitals:    05/21/24 1118   BP: 101/65   BP Location: Left arm   Pulse: 71   Resp: 16   Temp: 36.4 °C (97.5 °F)   SpO2: 97%   Weight: 65.8 kg (145 lb)   Height: 1.778 m (5' 10\")     PHYSICAL EXAM  Gen. Appearance - well-developed, well-nourished, 38 y.o., White female in " no acute distress.     Skin - warm, pink and dry without rash or concerning lesions.     Mental Status - alert and oriented times 3. Normal mood and affect appropriate to mood.     Neck - supple without lymphadenopathy. Carotid pulses are normal without bruits. Thyroid is normal in midline without nodules.    Chest - lungs are clear to auscultation without rales, rhonchi or wheezes.    Heart - regular, rate, and rhythm without murmurs, rubs or gallops.     Abdomen - soft, flat, nontender, nondistended. No masses, hepatomegaly or splenomegaly is noted. No rebound, rigidity or guarding is noted. Bowel sounds are normoactive. 3 well healed incisions in the upper abdomen from recent surgery.     Extremities - no cyanosis, clubbing or edema. Pedal pulses are 2+ normal at the dorsalis pedis and posterior tibial pulses bilaterally.     Neurological - cranial nerves II through XII are grossly intact. Motor strength 5/5 at all fours.    Lab Results   Component Value Date    ALBUMIN 4.3 05/03/2024    ALT 7 05/03/2024    AST 12 05/03/2024    BUN 19 05/03/2024    CALCIUM 9.5 05/03/2024     05/03/2024    CHOL 164 05/03/2024    CO2 29 05/03/2024    CREATININE 0.73 05/03/2024    EGFR >90 05/03/2024    GLUCOSE 83 05/03/2024    HDL 43.0 05/03/2024    HCT 39.5 05/03/2024    HGB 12.9 05/03/2024    K 4.0 05/03/2024    LDLCALC 111 (H) 05/03/2024     05/03/2024     05/03/2024    TRIG 51 05/03/2024    TSH 2.01 05/03/2024    WBC 5.4 05/03/2024   Results  Reviewed lab results from 05/03.     ASSESSMENT:  1. Psychophysiological insomnia  Follow Up In [facility] - PCP - Established    traZODone (Desyrel) 150 mg tablet    DISCONTINUED: traZODone (Desyrel) 150 mg tablet      2. Anxiety  Follow Up In [facility] - PCP - Established      3. Major depression, chronic        4. Bipolar disorder, current episode mixed, moderate (Multi)          PLAN:  Patient to continue current medications (with any  exceptions as noted) and diet. Follow-up in 3 month(s) otherwise as needed.     Patient was given refill(s) on:   Trazodone HCl 150 mg, TAKE 1 TAB BY MOUTH AT BEDTIME AS NEEDED    Rx(s) sent to pharmacy.    Scribe Attestation  By signing my name below, I, Eyad Rudolph   attest that this documentation has been prepared under the direction and in the presence of Thiago Cordon MD.

## 2024-08-22 DIAGNOSIS — F32.9 MAJOR DEPRESSION, CHRONIC: ICD-10-CM

## 2024-08-22 DIAGNOSIS — F41.9 ANXIETY: ICD-10-CM

## 2024-08-22 RX ORDER — VENLAFAXINE HYDROCHLORIDE 150 MG/1
150 CAPSULE, EXTENDED RELEASE ORAL DAILY
Qty: 90 CAPSULE | Refills: 1 | Status: SHIPPED | OUTPATIENT
Start: 2024-08-22 | End: 2025-02-18

## 2024-08-22 NOTE — TELEPHONE ENCOUNTER
Rx Refill Request Telephone Encounter    Name:  Martha Obrien  :  [id number]  Medication Name:  venlafaxine XR (Effexor-XR) 150 mg 24 hr capsule     Specific Pharmacy location:  [facility]  Date of last appointment:  24  Date of next appointment:  N/A  Best number to reach patient:  [phone]

## 2024-10-01 NOTE — PROGRESS NOTES
HPI: Martha Obrien is a 39 y.o. female presenting for follow-up of Follow-up (Psychophysiological insomnia//)  . I last saw the patient 5/21/2024    Patient is here for a F/U  Pt said no to flu vaccine    She has lost some weight, she wants to see if theres something she can take to help her gain weight, she took an anti depressant called mirtazapine before that made her gain. She would like to try the same again.    Says her current medication trazodone isn't working for her anymore.    Past medical, surgical, and family history reviewed.  Reviewed and documented all medications   Pt eating well, exercising as tolerated and taking medications as directed    ROS  Except positives as noted in the CC & HPI   Constitutional: Denies fevers, chills, night sweats, fatigue, weight changes, change in appetite   Eyes: Denies blurry vision, double vision   ENT: Denies otalgia, trouble hearing, tinnitus, vertigo, nasal congestion, rhinorrhea, sore throat   Neck: Denies swelling, masses   Cardiovascular: Denies chest pain, palpitations, edema, orthopnea, syncope   Respiratory: Denies dyspnea, cough, wheezing, postural nocturnal dyspnea   Gastrointestinal: Denies abdominal pain, nausea, vomiting, diarrhea, constipation, melena, hematochezia   Genitourinary: Denies dysuria, hematuria, frequency, urgency   Musculoskeletal: Denies back pain, neck pain, arthralgias, myalgias   Integumentary: Denies skin lesions, rashes, masses   Neurological: Denies dizziness, confusion, limb weakness, paresthesias, syncope, convulsions   Psychiatric: Denies depression, anxiety, homicidal ideations, suicidal ideations, sleep disturbances   Endocrine: Denies polyphagia, polydipsia, polyuria, weakness, hair thinning, heat intolerance, cold intolerance, weight changes   Heme/Lymph: Denies easy bruising, easy bleeding, swollen glands     Vitals:    10/02/24 1507   BP: 104/90   BP Location: Right arm   Pulse: 59   Resp: 16   Temp: 36.2 °C (97.2 °F)  "  SpO2: 99%   Weight: 57.2 kg (126 lb)   Height: 1.778 m (5' 10\")       PHYSICAL EXAM  Gen. Appearance - well-developed, well-nourished, 39 y.o., White female in no acute distress.     Skin - warm, pink and dry without rash or concerning lesions.     Mental Status - alert and oriented times 3. Normal mood and affect appropriate to mood.     Neck - supple without lymphadenopathy. Carotid pulses are normal without bruits. Thyroid is normal in midline without nodules.    Chest - lungs are clear to auscultation without rales, rhonchi or wheezes.    Heart - regular, rate, and rhythm without murmurs, rubs or gallops.     Abdomen - soft, flat, nontender, nondistended. No masses, hepatomegaly or splenomegaly is noted. No rebound, rigidity or guarding is noted. Bowel sounds are normoactive. 3 well healed incisions in the upper abdomen from recent surgery.     Extremities - no cyanosis, clubbing or edema. Pedal pulses are 2+ normal at the dorsalis pedis and posterior tibial pulses bilaterally.     Neurological - cranial nerves II through XII are grossly intact. Motor strength 5/5 at all fours.    Lab Results   Component Value Date    ALBUMIN 4.3 05/03/2024    ALT 7 05/03/2024    AST 12 05/03/2024    BUN 19 05/03/2024    CALCIUM 9.5 05/03/2024     05/03/2024    CHOL 164 05/03/2024    CO2 29 05/03/2024    CREATININE 0.73 05/03/2024    EGFR >90 05/03/2024    GLUCOSE 83 05/03/2024    HDL 43.0 05/03/2024    HCT 39.5 05/03/2024    HGB 12.9 05/03/2024    K 4.0 05/03/2024    LDLCALC 111 (H) 05/03/2024     05/03/2024     05/03/2024    TRIG 51 05/03/2024    TSH 2.01 05/03/2024    WBC 5.4 05/03/2024       ASSESSMENT:  1. Psychophysiological insomnia  mirtazapine (Remeron) 15 mg tablet      2. Bipolar disorder, current episode mixed, moderate (Multi)            PLAN:  Patient to continue current medications (with any exceptions as noted) and diet. Follow-up in 3 month(s) otherwise as needed.     Patient to discontinue " the Trazodone by reducing the dosage to half for a week and then stop it.    Patient was started on:   Mirtazapine 15 mg, TAKE 1/2 TABLET NIGHTLY FOR 1 WEEK AND THEN INCREASE THE DOSE TO 1 WHOLE TABLE THERAFTER.     Rx(s) sent to pharmacy.      Scribe Attestation  By signing my name below, IJohanny , Eyad   attest that this documentation has been prepared under the direction and in the presence of Thiago Cordon MD.

## 2024-10-02 ENCOUNTER — APPOINTMENT (OUTPATIENT)
Dept: PRIMARY CARE | Facility: CLINIC | Age: 39
End: 2024-10-02
Payer: COMMERCIAL

## 2024-10-02 VITALS
SYSTOLIC BLOOD PRESSURE: 104 MMHG | TEMPERATURE: 97.2 F | HEART RATE: 59 BPM | OXYGEN SATURATION: 99 % | HEIGHT: 70 IN | RESPIRATION RATE: 16 BRPM | WEIGHT: 126 LBS | DIASTOLIC BLOOD PRESSURE: 90 MMHG | BODY MASS INDEX: 18.04 KG/M2

## 2024-10-02 DIAGNOSIS — F51.04 PSYCHOPHYSIOLOGICAL INSOMNIA: Primary | ICD-10-CM

## 2024-10-02 DIAGNOSIS — F31.62 BIPOLAR DISORDER, CURRENT EPISODE MIXED, MODERATE (MULTI): ICD-10-CM

## 2024-10-02 PROCEDURE — 3008F BODY MASS INDEX DOCD: CPT | Performed by: FAMILY MEDICINE

## 2024-10-02 PROCEDURE — 1036F TOBACCO NON-USER: CPT | Performed by: FAMILY MEDICINE

## 2024-10-02 PROCEDURE — 99213 OFFICE O/P EST LOW 20 MIN: CPT | Performed by: FAMILY MEDICINE

## 2024-10-02 RX ORDER — MIRTAZAPINE 15 MG/1
TABLET, FILM COATED ORAL
Qty: 30 TABLET | Refills: 2 | Status: SHIPPED | OUTPATIENT
Start: 2024-10-02

## 2024-10-24 DIAGNOSIS — F51.04 PSYCHOPHYSIOLOGICAL INSOMNIA: Primary | ICD-10-CM

## 2024-10-24 RX ORDER — MIRTAZAPINE 15 MG/1
15 TABLET, FILM COATED ORAL NIGHTLY
Qty: 90 TABLET | Refills: 1 | Status: SHIPPED | OUTPATIENT
Start: 2024-10-24

## 2024-11-20 DIAGNOSIS — F51.04 PSYCHOPHYSIOLOGICAL INSOMNIA: ICD-10-CM

## 2024-11-20 RX ORDER — TRAZODONE HYDROCHLORIDE 150 MG/1
150 TABLET ORAL NIGHTLY
Qty: 90 TABLET | Refills: 1 | Status: SHIPPED | OUTPATIENT
Start: 2024-11-20 | End: 2025-05-19

## 2024-11-20 NOTE — TELEPHONE ENCOUNTER
Rx Refill Request     Name: Martha Obrien  :  1985     Date of last appointment:  10/2/2024   Date of next appointment:  Visit date not found   Best number to reach patient:  [phone]

## 2024-12-07 DIAGNOSIS — F51.04 PSYCHOPHYSIOLOGICAL INSOMNIA: Primary | ICD-10-CM

## 2024-12-09 RX ORDER — MIRTAZAPINE 15 MG/1
15 TABLET, FILM COATED ORAL NIGHTLY
Qty: 180 TABLET | Refills: 1 | Status: SHIPPED | OUTPATIENT
Start: 2024-12-09

## 2025-02-11 DIAGNOSIS — F32.9 MAJOR DEPRESSION, CHRONIC: ICD-10-CM

## 2025-02-11 DIAGNOSIS — F41.9 ANXIETY: Primary | ICD-10-CM

## 2025-02-11 RX ORDER — VENLAFAXINE HYDROCHLORIDE 150 MG/1
150 CAPSULE, EXTENDED RELEASE ORAL DAILY
Qty: 90 CAPSULE | Refills: 1 | Status: SHIPPED | OUTPATIENT
Start: 2025-02-11 | End: 2025-08-10

## 2025-02-11 NOTE — TELEPHONE ENCOUNTER
Rx Refill Request     Name: Martha Obrien  :  1985   Medication Name:  venlafaxine XR (Effexor-XR) 150 mg 24 hr capsule   Specific Pharmacy location:  [facility]/pharmacy #[id number] [address] AT [place] [id number]  Phone: [phone]  Fax: [phone]   Date of last appointment:  10/2/2024   Date of next appointment:  2025   Best number to reach patient:  [phone]   Patient will be out of medication before her appointment.

## 2025-02-25 ENCOUNTER — APPOINTMENT (OUTPATIENT)
Dept: PRIMARY CARE | Facility: CLINIC | Age: 40
End: 2025-02-25
Payer: COMMERCIAL

## 2025-03-03 ENCOUNTER — APPOINTMENT (OUTPATIENT)
Dept: PRIMARY CARE | Facility: CLINIC | Age: 40
End: 2025-03-03
Payer: COMMERCIAL

## 2025-03-03 VITALS
TEMPERATURE: 98.4 F | HEIGHT: 70 IN | WEIGHT: 121 LBS | BODY MASS INDEX: 17.32 KG/M2 | HEART RATE: 73 BPM | DIASTOLIC BLOOD PRESSURE: 60 MMHG | OXYGEN SATURATION: 98 % | RESPIRATION RATE: 16 BRPM | SYSTOLIC BLOOD PRESSURE: 92 MMHG

## 2025-03-03 DIAGNOSIS — F12.90 MARIJUANA USE: ICD-10-CM

## 2025-03-03 DIAGNOSIS — F31.62 BIPOLAR DISORDER, CURRENT EPISODE MIXED, MODERATE (MULTI): ICD-10-CM

## 2025-03-03 DIAGNOSIS — R79.89 ELEVATED TSH: ICD-10-CM

## 2025-03-03 DIAGNOSIS — Z13.220 LIPID SCREENING: ICD-10-CM

## 2025-03-03 DIAGNOSIS — F32.9 MAJOR DEPRESSION, CHRONIC: Primary | ICD-10-CM

## 2025-03-03 DIAGNOSIS — R63.6 UNDERWEIGHT WITH BODY MASS INDEX (BMI) LESS THAN 18.5: ICD-10-CM

## 2025-03-03 DIAGNOSIS — F41.9 ANXIETY: ICD-10-CM

## 2025-03-03 PROCEDURE — 1036F TOBACCO NON-USER: CPT | Performed by: FAMILY MEDICINE

## 2025-03-03 PROCEDURE — 3008F BODY MASS INDEX DOCD: CPT | Performed by: FAMILY MEDICINE

## 2025-03-03 PROCEDURE — 99213 OFFICE O/P EST LOW 20 MIN: CPT | Performed by: FAMILY MEDICINE

## 2025-03-03 RX ORDER — VENLAFAXINE HYDROCHLORIDE 150 MG/1
150 CAPSULE, EXTENDED RELEASE ORAL DAILY
Qty: 90 CAPSULE | Refills: 1 | Status: SHIPPED | OUTPATIENT
Start: 2025-03-03 | End: 2025-08-30

## 2025-03-03 RX ORDER — RISPERIDONE 0.5 MG/1
0.5 TABLET ORAL NIGHTLY
Qty: 30 TABLET | Refills: 1 | Status: SHIPPED | OUTPATIENT
Start: 2025-03-03 | End: 2025-05-02

## 2025-03-03 ASSESSMENT — PATIENT HEALTH QUESTIONNAIRE - PHQ9
1. LITTLE INTEREST OR PLEASURE IN DOING THINGS: NOT AT ALL
SUM OF ALL RESPONSES TO PHQ9 QUESTIONS 1 AND 2: 0
2. FEELING DOWN, DEPRESSED OR HOPELESS: NOT AT ALL

## 2025-03-03 ASSESSMENT — ANXIETY QUESTIONNAIRES
3. WORRYING TOO MUCH ABOUT DIFFERENT THINGS: NOT AT ALL
2. NOT BEING ABLE TO STOP OR CONTROL WORRYING: NOT AT ALL
4. TROUBLE RELAXING: NOT AT ALL
1. FEELING NERVOUS, ANXIOUS, OR ON EDGE: NOT AT ALL
7. FEELING AFRAID AS IF SOMETHING AWFUL MIGHT HAPPEN: NOT AT ALL
GAD7 TOTAL SCORE: 0
5. BEING SO RESTLESS THAT IT IS HARD TO SIT STILL: NOT AT ALL
IF YOU CHECKED OFF ANY PROBLEMS ON THIS QUESTIONNAIRE, HOW DIFFICULT HAVE THESE PROBLEMS MADE IT FOR YOU TO DO YOUR WORK, TAKE CARE OF THINGS AT HOME, OR GET ALONG WITH OTHER PEOPLE: NOT DIFFICULT AT ALL
6. BECOMING EASILY ANNOYED OR IRRITABLE: NOT AT ALL

## 2025-03-03 ASSESSMENT — ENCOUNTER SYMPTOMS
LOSS OF SENSATION IN FEET: 0
DEPRESSION: 0
OCCASIONAL FEELINGS OF UNSTEADINESS: 0

## 2025-03-03 NOTE — PROGRESS NOTES
Subjective   Patient ID: Martha Obrien is a 39 y.o. female who presents for Follow Up of Anxiety, Depression and Migraine . I last saw the patient on 10/2/2024  .     HPI  Patient would like to switch from Remeron to risperidone. She states that she thought it was the right medication at first when she requested it but it was not.     Patient requests for a refill of her medication.      Patient mentions not being able to sleep at nights and feeling tired because of that.    Patient mentions not having appetite and not eating 3 meals a day. Patient does smoke Marjuana in the evenings which she thought might increase her appetite.    Patient says that she recently had an episode of FLU A along with her son.    Past medical, surgical, and family history reviewed.  Reviewed and documented all medications   Pt eating well, exercising as tolerated and taking medications as directed.      Review of Systems  Except positives as noted in the CC & HPI      Constitutional: Denies fevers, chills, night sweats, fatigue, weight changes, change in appetite    Eyes: Denies blurry vision, double vision    ENT: Denies otalgia, trouble hearing, tinnitus, vertigo, nasal congestion, rhinorrhea, sore throat    Neck: Denies swelling, masses    Cardiovascular: Denies chest pain, palpitations, edema, orthopnea, syncope    Respiratory: Denies dyspnea, cough, wheezing, postural nocturnal dyspnea    Gastrointestinal: Denies abdominal pain, nausea, vomiting, diarrhea, constipation, melena, hematochezia    Genitourinary: Denies dysuria, hematuria  Musculoskeletal: Denies back pain, neck pain, arthralgias, myalgias    Integumentary: Denies skin lesions, rashes, masses    Neurological: Denies dizziness, headaches, confusion, limb weakness, paresthesias, syncope, convulsions    Psychiatric: Denies depression, anxiety, homicidal ideations, suicidal ideations, sleep disturbances    Endocrine: Denies polyphagia, polydipsia, polyuria, weakness, hair  "thinning, heat intolerance, cold intolerance, weight changes    Heme/Lymph: Denies easy bruising, easy bleeding, swollen glands    Objective   Vitals:    03/03/25 1006 03/03/25 1053   BP: 100/62 92/60   BP Location:  Right arm   Patient Position:  Sitting   BP Cuff Size:  Adult   Pulse: 73    Resp: 16    Temp: 36.9 °C (98.4 °F)    SpO2: 98%    Weight: 54.9 kg (121 lb)    Height: 1.778 m (5' 10\")         Physical Exam  Gen. Appearance - well-developed, well-nourished, 39 y.o., White female in no acute distress.      Skin - warm, pink and dry without rash or concerning lesions.      Mental Status - alert and oriented times 3. Normal mood and affect appropriate to mood.      Neck - supple without lymphadenopathy. Carotid pulses are normal without bruits. Thyroid is normal in midline without nodules.     Chest - lungs are clear to auscultation without rales, rhonchi or wheezes.     Heart - regular, rate, and rhythm without murmurs, rubs or gallops.      Abdomen - soft, flat, nontender, nondistended. No masses, hepatomegaly or splenomegaly is noted. No rebound, rigidity or guarding is noted. Bowel sounds are normoactive. 3 well healed incisions in the upper abdomen from recent surgery.      Extremities - no cyanosis, clubbing or edema. Pedal pulses are 2+ normal at the dorsalis pedis and posterior tibial pulses bilaterally.      Neurological - cranial nerves II through XII are grossly intact. Motor strength 5/5 at all fours.    Assessment   1. Major depression, chronic  venlafaxine XR (Effexor-XR) 150 mg 24 hr capsule      2. Anxiety  venlafaxine XR (Effexor-XR) 150 mg 24 hr capsule      3. Bipolar disorder, current episode mixed, moderate (Multi)  risperiDONE (RisperDAL) 0.5 mg tablet    CBC and Auto Differential    Comprehensive Metabolic Panel      4. Elevated TSH  TSH with reflex to Free T4 if abnormal      5. Marijuana use        6. Underweight with body mass index (BMI) less than 18.5        7. Lipid screening  " Lipid Panel          Patient to continue current medications (with any exceptions as noted) and diet. Follow-up in 3 month(s) otherwise as needed.      Patient is to return for fasting CBC and Auto Differential, Comprehensive Metabolic Panel, Lipid profile, Hemoglobin A1c labs at their convenience prior to their next appointment. Fasting is nothing to eat or drink except water or black coffee for 8-12 hours. Will call patient with results when available.      Patient was given refill(s) on:   Venlafaxine  mg, TAKE 1 CAPSULE BY MOUTH DAILY    Rx(s) sent to pharmacy.      Patient was started on:   RisperiDONE (RisperDAL) 0.5 mg tablet, TAKE 1 TABLET (0.5 MG) BY MOUTH ONCE DAILY AT BEDTIME.     Rx(s) sent to pharmacy.      Patient was advised to discontinue her Mirtazapine and started her on risperidone. Patient will call if she wants to increase the dose to 1 mg after 30 days.    Encouraged patient to drink plenty of fluids, at least 64 oz of water daily.      Patient was encouraged to eat 3 meals per day.    Scribe Attestation  By signing my name below, IJohanny Scribe   attest that this documentation has been prepared under the direction and in the presence of Thiago Cordon MD.      All medical record entries made by the scribe were at my direction and personally dictated by me. I have reviewed the chart and agree that the record accurately reflects my personal performance of the history, physical exam, discussion, and plan.    Thiago Cordon M.D.

## 2025-03-25 DIAGNOSIS — F31.62 BIPOLAR DISORDER, CURRENT EPISODE MIXED, MODERATE (MULTI): ICD-10-CM

## 2025-03-25 RX ORDER — RISPERIDONE 0.5 MG/1
0.5 TABLET ORAL NIGHTLY
Qty: 90 TABLET | Refills: 1 | Status: SHIPPED | OUTPATIENT
Start: 2025-03-25

## 2025-03-25 NOTE — TELEPHONE ENCOUNTER
Rx Refill Request  Pharmacy comment: REQUEST FOR 90 DAYS PRESCRIPTION. DX Code Needed.       Name: Martha Obrien  :  1985     Date of last appointment:  3/3/2025   Date of next appointment:  Visit date not found   Best number to reach patient:  [phone]

## 2025-05-18 DIAGNOSIS — F51.04 PSYCHOPHYSIOLOGICAL INSOMNIA: ICD-10-CM

## 2025-05-19 RX ORDER — TRAZODONE HYDROCHLORIDE 150 MG/1
150 TABLET ORAL NIGHTLY
Qty: 90 TABLET | Refills: 1 | Status: SHIPPED | OUTPATIENT
Start: 2025-05-19 | End: 2025-11-15

## 2025-05-19 NOTE — TELEPHONE ENCOUNTER
Rx Refill Request     Name: Martha Obrien  :  1985     Date of last appointment:  3/3/2025   Date of next appointment:  Visit date not found   Best number to reach patient:  [phone]

## 2025-06-03 DIAGNOSIS — Z13.220 LIPID SCREENING: ICD-10-CM

## 2025-06-03 DIAGNOSIS — R79.89 ELEVATED TSH: ICD-10-CM

## 2025-06-03 DIAGNOSIS — F31.62 BIPOLAR DISORDER, CURRENT EPISODE MIXED, MODERATE (MULTI): ICD-10-CM
